# Patient Record
Sex: MALE | Race: WHITE | Employment: OTHER | URBAN - METROPOLITAN AREA
[De-identification: names, ages, dates, MRNs, and addresses within clinical notes are randomized per-mention and may not be internally consistent; named-entity substitution may affect disease eponyms.]

---

## 2017-12-15 ENCOUNTER — HOSPITAL ENCOUNTER (EMERGENCY)
Facility: CLINIC | Age: 57
Discharge: HOME | End: 2017-12-15
Attending: FAMILY MEDICINE | Admitting: FAMILY MEDICINE
Payer: OTHER MISCELLANEOUS

## 2017-12-15 ENCOUNTER — APPOINTMENT (OUTPATIENT)
Dept: GENERAL RADIOLOGY | Facility: CLINIC | Age: 57
End: 2017-12-15
Attending: FAMILY MEDICINE
Payer: OTHER MISCELLANEOUS

## 2017-12-15 VITALS
HEART RATE: 70 BPM | OXYGEN SATURATION: 97 % | HEIGHT: 76 IN | RESPIRATION RATE: 16 BRPM | WEIGHT: 250 LBS | BODY MASS INDEX: 30.44 KG/M2 | DIASTOLIC BLOOD PRESSURE: 82 MMHG | TEMPERATURE: 97.6 F | SYSTOLIC BLOOD PRESSURE: 129 MMHG

## 2017-12-15 DIAGNOSIS — L03.114 CELLULITIS OF LEFT ELBOW: ICD-10-CM

## 2017-12-15 LAB
ANION GAP SERPL CALCULATED.3IONS-SCNC: 10 MMOL/L (ref 3–14)
BASOPHILS # BLD AUTO: 0 10E9/L (ref 0–0.2)
BASOPHILS NFR BLD AUTO: 0.3 %
BUN SERPL-MCNC: 16 MG/DL (ref 7–30)
CALCIUM SERPL-MCNC: 8.3 MG/DL (ref 8.5–10.1)
CHLORIDE SERPL-SCNC: 101 MMOL/L (ref 94–109)
CO2 SERPL-SCNC: 22 MMOL/L (ref 20–32)
CREAT SERPL-MCNC: 0.79 MG/DL (ref 0.66–1.25)
CRP SERPL-MCNC: 5.2 MG/L (ref 0–8)
DIFFERENTIAL METHOD BLD: NORMAL
EOSINOPHIL # BLD AUTO: 0.2 10E9/L (ref 0–0.7)
EOSINOPHIL NFR BLD AUTO: 2.2 %
ERYTHROCYTE [DISTWIDTH] IN BLOOD BY AUTOMATED COUNT: 12.8 % (ref 10–15)
ERYTHROCYTE [SEDIMENTATION RATE] IN BLOOD BY WESTERGREN METHOD: 5 MM/H (ref 0–20)
GFR SERPL CREATININE-BSD FRML MDRD: >90 ML/MIN/1.7M2
GLUCOSE SERPL-MCNC: 334 MG/DL (ref 70–99)
HCT VFR BLD AUTO: 42.5 % (ref 40–53)
HGB BLD-MCNC: 14.9 G/DL (ref 13.3–17.7)
IMM GRANULOCYTES # BLD: 0 10E9/L (ref 0–0.4)
IMM GRANULOCYTES NFR BLD: 0.3 %
LYMPHOCYTES # BLD AUTO: 2.1 10E9/L (ref 0.8–5.3)
LYMPHOCYTES NFR BLD AUTO: 26.5 %
MCH RBC QN AUTO: 29.9 PG (ref 26.5–33)
MCHC RBC AUTO-ENTMCNC: 35.1 G/DL (ref 31.5–36.5)
MCV RBC AUTO: 85 FL (ref 78–100)
MONOCYTES # BLD AUTO: 1.1 10E9/L (ref 0–1.3)
MONOCYTES NFR BLD AUTO: 14.4 %
NEUTROPHILS # BLD AUTO: 4.4 10E9/L (ref 1.6–8.3)
NEUTROPHILS NFR BLD AUTO: 56.3 %
PLATELET # BLD AUTO: 207 10E9/L (ref 150–450)
POTASSIUM SERPL-SCNC: 4.3 MMOL/L (ref 3.4–5.3)
RBC # BLD AUTO: 4.99 10E12/L (ref 4.4–5.9)
SODIUM SERPL-SCNC: 133 MMOL/L (ref 133–144)
WBC # BLD AUTO: 7.9 10E9/L (ref 4–11)

## 2017-12-15 PROCEDURE — 96374 THER/PROPH/DIAG INJ IV PUSH: CPT | Performed by: FAMILY MEDICINE

## 2017-12-15 PROCEDURE — 73070 X-RAY EXAM OF ELBOW: CPT | Mod: LT

## 2017-12-15 PROCEDURE — 86140 C-REACTIVE PROTEIN: CPT | Performed by: FAMILY MEDICINE

## 2017-12-15 PROCEDURE — 85025 COMPLETE CBC W/AUTO DIFF WBC: CPT | Performed by: EMERGENCY MEDICINE

## 2017-12-15 PROCEDURE — 99284 EMERGENCY DEPT VISIT MOD MDM: CPT | Mod: 25 | Performed by: FAMILY MEDICINE

## 2017-12-15 PROCEDURE — 99284 EMERGENCY DEPT VISIT MOD MDM: CPT | Mod: Z6 | Performed by: FAMILY MEDICINE

## 2017-12-15 PROCEDURE — 85652 RBC SED RATE AUTOMATED: CPT | Performed by: FAMILY MEDICINE

## 2017-12-15 PROCEDURE — 25000128 H RX IP 250 OP 636: Performed by: FAMILY MEDICINE

## 2017-12-15 PROCEDURE — 80048 BASIC METABOLIC PNL TOTAL CA: CPT | Performed by: EMERGENCY MEDICINE

## 2017-12-15 RX ORDER — CEPHALEXIN 500 MG/1
500 CAPSULE ORAL 4 TIMES DAILY
Qty: 40 CAPSULE | Refills: 0 | Status: SHIPPED | OUTPATIENT
Start: 2017-12-15 | End: 2021-11-15

## 2017-12-15 RX ORDER — SULFAMETHOXAZOLE/TRIMETHOPRIM 800-160 MG
1 TABLET ORAL 2 TIMES DAILY
Qty: 20 TABLET | Refills: 0 | Status: SHIPPED | OUTPATIENT
Start: 2017-12-15 | End: 2017-12-25

## 2017-12-15 RX ORDER — CEFAZOLIN SODIUM 2 G/100ML
2 INJECTION, SOLUTION INTRAVENOUS ONCE
Status: COMPLETED | OUTPATIENT
Start: 2017-12-15 | End: 2017-12-15

## 2017-12-15 RX ADMIN — CEFAZOLIN SODIUM 2 G: 2 INJECTION, SOLUTION INTRAVENOUS at 11:08

## 2017-12-15 NOTE — DISCHARGE INSTRUCTIONS
Rest your left elbow as much as possible for the next 48 hours.  Take cephalexin 500 mg 4 times per day  Take trimethoprim sulfamethoxazole twice daily  Return for reevaluation on Sunday to see Dr. Liu  Return sooner if significant new or worsening symptoms including fevers, increasing redness and swelling, or other new concerning symptoms.

## 2017-12-15 NOTE — ED AVS SNAPSHOT
[facility] Emergency Department    [address] [address]    Phone:  [phone]    Fax:  [phone]                                       Kartik Swain   MRN: [id number]    Department:  [facility] Emergency Department   Date of Visit:  12/15/2017           After Visit Summary Signature Page     I have received my discharge instructions, and my questions have been answered. I have discussed any challenges I see with this plan with the nurse or doctor.    ..........................................................................................................................................  Patient/Patient Representative Signature      ..........................................................................................................................................  Patient Representative Print Name and Relationship to Patient    ..................................................               ................................................  Date                                            Time    ..........................................................................................................................................  Reviewed by Signature/Title    ...................................................              ..............................................  Date                                                            Time

## 2017-12-15 NOTE — ED PROVIDER NOTES
History     Chief Complaint   Patient presents with     Joint Swelling     swollen left elbow, hx of mrsa     HPI    Kartik Swain is a 57 year old male who presents with left elbow redness and swelling.  This began 2-3 days ago and has become progressively more painful.  He said he was icing it all night long.  It hurts when he moves it.  He previously had surgery around his elbow.  In 2005 at 2006 he had back surgery and developed an ulnar neuropathy, requiring transposition, which failed and had to be repeated.  He thinks he may have developed an MRSA infection in that elbow at that time.  I reviewed his records through care everywhere but they only go back to 2010.  At that time MRSA was only found in his scalp and his nares but there was no MRSA in joints.  He had been holding his grandson all day on Monday and has after that symptoms began, which was 4 days ago.  His morning his left hand felt numb and tingly but that is now improving.  He states he is not having fevers, sweats, chills, malaise, muscle aches.  He has no other areas of pain or redness or swelling.  He does have a past history of type II diabetes.    Records from care everywhere:  Allergies    No Known Allergies  Current Medications  Reconcile with Patient's Chart    Prescription Sig. Disp. Refills Start Date End Date Status   aspirin 81 MG EC tablet   Take 81 mg by mouth daily.         Active   lisinopril (PRINIVIL,ZESTRIL) 5 MG tablet    Indications: Hypertension Take 1 tablet (5 mg total) by mouth daily. 90 tablet   3 02/16/2015   Active   blood-glucose meter Misc   Use 1 Device As Directed 3 (three) times a day. Pharmacist: Please read note. 1 each   0 02/01/2016 08/25/2020 Active   blood glucose test (ACCU-CHEK SMARTVIEW TEST STRIP) strips    Indications: Type II or unspecified type diabetes mellitus with other specified manifestations, not stated as uncontrolled Use 1 each As Directed as needed. Dispense brand per patient's insurance at  "pharmacy discretion. 100 each   11 02/01/2016   Active   generic lancets (ACCU-CHEK FASTCLIX)    Indications: Type II or unspecified type diabetes mellitus with other specified manifestations, not stated as uncontrolled Dispense brand per patient's insurance at pharmacy discretion. 100 each   11 02/01/2016   Active   atorvastatin (LIPITOR) 40 MG tablet    Indications: Hyperlipidemia TAKE 1 TABLET (40 MG TOTAL) BY MOUTH BEDTIME. 90 tablet   2 04/17/2016   Active   pen needle, diabetic 31 gauge x 5/16\" Ndle    Indications: Type 2 diabetes mellitus Use 4 times daily with insulin 400 each   1 08/18/2016   Active   metFORMIN (GLUCOPHAGE) 1000 MG tablet    Indications: Type 2 diabetes mellitus TAKE 1 TABLET BY MOUTH TWICE A DAY WITH MEALS 180 tablet   0 08/18/2016   Active   pregabalin (LYRICA) 100 MG capsule    Indications: Back pain TAKE 1 CAPSULE (100 MG TOTAL) BY MOUTH 3 (THREE) TIMES A DAY. 270 capsule   1 08/19/2016   Active   oxyCODONE (ROXICODONE) 5 MG immediate release tablet    Indications: Back pain 1-2 tabs PO q4hr PRN pain. Do not drive. Do not mix wih alcohol. 10 tablet   0 08/19/2016   Active   sildenafil (REVATIO) 20 mg tablet    Indications: Erectile dysfunction, unspecified erectile dysfunction type Take 3, 4, or 5 up to once daily for ED. Replaces Viagra. 30 tablet   11 08/24/2016   Active   insulin lispro (HUMALOG KWIKPEN) 100 unit/mL pen   Inject 16 Units under the skin 3 (three) times a day before meals. **REPLACES** Novolog. Mandate by ins. coverage. Pharm: Read note. 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 05/12/2015   Chronic Pansinusitis     Overview:     Created by Conversion  [employer] [facility] Annotation: Feb 25 2009 11:07AM - BradleyMayra mike: per CT 2/09       Allergies     Overview:     Created by Conversion       Adult Sleep Apnea     Overview:     Created by Conversion  [facility] Annotation: Nov 10 2009 11:51AM - Kayli Lowe: not using   CPAP, can't tolerate it    Replacement Utility updated for latest IMO load     Lower Back Pain     Overview:     Created by Conversion  [facility] Annotation: Jan 16 2008 12:24PM - Kylie Garcia: Sporadic flares       Diabetic Peripheral Neuropathy     Overview:     Created by Conversion       Insomnia     Overview:     Created by Conversion       Bee Sting     Overview:     Created by Conversion  [facility] Annotation: Sep 17 2013 9:06AM - Kayli Lowe: with secondary  infection/cellulitis       Diabetes Mellitus With Foot Ulcer     Overview:     Created by Conversion       Male Erectile Disorder     Overview:     Created by Conversion       Anxiety     Overview:     Created by Conversion    Replacement Utility updated for latest IMO load     Hyperlipidemia     Overview:     Created by Conversion       Type 2 Diabetes Mellitus With Complication     Overview:     Created by Conversion       Problem List:    There are no active problems to display for this patient.       Past Medical History:    Past Medical History:   Diagnosis Date     DEPRESSIVE DISORDER NEC      DIABETES MELLITUS TYPE II-UNCOMPL      IDIO PERIPH NEURPTHY NEC        Past Surgical History:    Past Surgical History:   Procedure Laterality Date     SURGICAL HISTORY OF -   1980, 1982    Eardrum Replacement        Family History:    No family history on file.    Social History:  Marital Status:  Single [1]  Social History   Substance Use Topics     Smoking status: Not on file      "Smokeless tobacco: Not on file     Alcohol use Not on file        Medications:      cephALEXin (KEFLEX) 500 MG capsule   sulfamethoxazole-trimethoprim (BACTRIM DS/SEPTRA DS) 800-160 MG per tablet   LANTUS 100 UNIT/ML SC SOLN   LANTUS 100 UNIT/ML SC SOLN   GLUCOPHAGE 1000 MG OR TABS   AVANDIA 8 MG OR TABS   ONE TOUCH ULTRA TEST VI STRP   HUMALOG 100 UNIT/ML SC SOLN     Review of Systems  All other systems are reviewed and are negative    Physical Exam   BP: (!) 157/93  Pulse: 91  Temp: 97.6  F (36.4  C)  Resp: 16  Height: 193 cm (6' 4\")  Weight: 113.4 kg (250 lb)  SpO2: 97 %      Physical Exam  Nursing note and vitals were reviewed.  Constitutional: Awake and alert, healthy appearing 57-year-old in no acute distress, who does not appear acutely ill, and who answers questions appropriately and cooperates with examination.  HEENT: EOMI.   Neck: Freely mobile.  Musculoskeletal: Examination of the left elbow shows erythema surrounding the radial dorsal surface without palpable effusion in the radial humeral joint.  Range of motion is restricted by pain to lacking 30  of full extension.  There is pain with extension flexion and supination.  Flexion and supination are not limited.  There is no lymphangitic streaking.  No axillary adenopathy.  Hand is warm and well perfused.  Photos of the elbow are noted below.  Neurological: Alert, oriented, thought content logical, coherent   Skin: Warm, dry, no rashes.  Psychiatric: Affect broad and appropriate.              ED Course     ED Course     Procedures          Critical Care time:  none            Results for orders placed or performed during the hospital encounter of 12/15/17   Elbow XR, 2 view, left    Narrative    ELBOW LEFT TWO VIEWS December 15, 2017 9:39 AM     HISTORY: Swelling, redness, pain in left elbow.    COMPARISON: None.      Impression    IMPRESSION: Bones are normally aligned. Elbow effusion is present.  Scattered calcifications noted overlying the elbow joint " space, the  larger of which measures 5 mm. Densities also overlie the joint space  of the posterior aspect of the radial head. This calcification  measures up to 1.1 cm. Findings suggesting intra-articular loose  bodies. Slight cortical irregularity is noted at the posterior aspect  of the radial head suggestive of an impaction nondisplaced fracture at  this location. Clinically correlate with trauma for possibility of  acute fracture. Mild enthesopathy at the proximalmost aspect of the  ulna, triceps insertion site.    TAMMY STEVENS MD   CBC with platelets differential   Result Value Ref Range    WBC 7.9 4.0 - 11.0 10e9/L    RBC Count 4.99 4.4 - 5.9 10e12/L    Hemoglobin 14.9 13.3 - 17.7 g/dL    Hematocrit 42.5 40.0 - 53.0 %    MCV 85 78 - 100 fl    MCH 29.9 26.5 - 33.0 pg    MCHC 35.1 31.5 - 36.5 g/dL    RDW 12.8 10.0 - 15.0 %    Platelet Count 207 150 - 450 10e9/L    Diff Method Automated Method     % Neutrophils 56.3 %    % Lymphocytes 26.5 %    % Monocytes 14.4 %    % Eosinophils 2.2 %    % Basophils 0.3 %    % Immature Granulocytes 0.3 %    Absolute Neutrophil 4.4 1.6 - 8.3 10e9/L    Absolute Lymphocytes 2.1 0.8 - 5.3 10e9/L    Absolute Monocytes 1.1 0.0 - 1.3 10e9/L    Absolute Eosinophils 0.2 0.0 - 0.7 10e9/L    Absolute Basophils 0.0 0.0 - 0.2 10e9/L    Abs Immature Granulocytes 0.0 0 - 0.4 10e9/L   Basic metabolic panel   Result Value Ref Range    Sodium 133 133 - 144 mmol/L    Potassium 4.3 3.4 - 5.3 mmol/L    Chloride 101 94 - 109 mmol/L    Carbon Dioxide 22 20 - 32 mmol/L    Anion Gap 10 3 - 14 mmol/L    Glucose 334 (H) 70 - 99 mg/dL    Urea Nitrogen 16 7 - 30 mg/dL    Creatinine 0.79 0.66 - 1.25 mg/dL    GFR Estimate >90 >60 mL/min/1.7m2    GFR Estimate If Black >90 >60 mL/min/1.7m2    Calcium 8.3 (L) 8.5 - 10.1 mg/dL   Erythrocyte sedimentation rate auto   Result Value Ref Range    Sed Rate 5 0 - 20 mm/h   CRP inflammation   Result Value Ref Range    CRP Inflammation 5.2 0.0 - 8.0 mg/L        Assessments & Plan (with Medical Decision Making)     57-year-old male presents with redness and swelling of the left elbow suggestive of cellulitis.  I don't appreciate effusion in the radial humeral joint and have low suspicion for septic arthritis or gout.  I suspect cellulitis of the skin in this area.  I reviewed x-rays with shows some chronic changes but no acute process.  CBC, sed rate, CRP are reassuring.  We are going to treat him with cephalexin and Septra and I will see him back in 2 days, unless he is worsening before that time in which case he should return to the emergency department.  He was advised to rest the elbow as much as possible and to avoid icing it.  He may apply heat not for more than 20 minutes and not to excess.  If he is worsening prior to 2 days from now he should return immediately.  He is comfortable with this plan and his questions were all answered.    I have reviewed the nursing notes.    I have reviewed the findings, diagnosis, plan and need for follow up with the patient.       New Prescriptions    CEPHALEXIN (KEFLEX) 500 MG CAPSULE    Take 1 capsule (500 mg) by mouth 4 times daily    SULFAMETHOXAZOLE-TRIMETHOPRIM (BACTRIM DS/SEPTRA DS) 800-160 MG PER TABLET    Take 1 tablet by mouth 2 times daily for 10 days       Final diagnoses:   Cellulitis of left elbow       12/15/2017   [facility] EMERGENCY DEPARTMENT     Alex Liu MD  12/15/17 0507

## 2017-12-15 NOTE — ED AVS SNAPSHOT
[facility] Emergency Department    [address] [address]    Phone:  [phone]    Fax:  [phone]                                       Kartik Swain   MRN: [id number]    Department:  [facility] Emergency Department   Date of Visit:  12/15/2017           Patient Information     Date Of Birth          1960        Your diagnoses for this visit were:     Cellulitis of left elbow        You were seen by Alex Liu MD.        Discharge Instructions       Rest your left elbow as much as possible for the next 48 hours.  Take cephalexin 500 mg 4 times per day  Take trimethoprim sulfamethoxazole twice daily  Return for reevaluation on Sunday to see Dr. Liu  Return sooner if significant new or worsening symptoms including fevers, increasing redness and swelling, or other new concerning symptoms.    24 Hour Appointment Hotline       To make an appointment at any [facility] clinic, call [phone] ([phone]). If you don't have a family doctor or clinic, we will help you find one. [facility] clinics are conveniently located to serve the needs of you and your family.             Review of your medicines      START taking        Dose / Directions Last dose taken    cephALEXin 500 MG capsule   Commonly known as:  KEFLEX   Dose:  500 mg   Quantity:  40 capsule        Take 1 capsule (500 mg) by mouth 4 times daily   Refills:  0        sulfamethoxazole-trimethoprim 800-160 MG per tablet   Commonly known as:  BACTRIM DS/SEPTRA DS   Dose:  1 tablet   Quantity:  20 tablet        Take 1 tablet by mouth 2 times daily for 10 days   Refills:  0          Our records show that you are taking the medicines listed below. If these are incorrect, please call your family doctor or clinic.        Dose / Directions Last dose taken    AVANDIA 8 MG tablet   Quantity:  60   Generic drug:  rosiglitazone        take one tablet by mouth twice daily   Refills:  2        GLUCOPHAGE 1000 MG tablet    Quantity:  60   Generic drug:  metFORMIN        1 TABLET TWICE DAILY   Refills:  2        humaLOG 100 UNIT/ML injection   Quantity:  10   Generic drug:  insulin lispro        use as directed with meals   Refills:  1        * LANTUS VIAL 100 UNIT/ML injection   Quantity:  10   Generic drug:  insulin glargine        as directed   Refills:  1        * LANTUS VIAL 100 UNIT/ML injection   Quantity:  10   Generic drug:  insulin glargine        inect instructed amount of lantus subcutaneously daily as directed   Refills:  1        ONETOUCH ULTRA test strip   Quantity:  100   Generic drug:  blood glucose monitoring        QID AND PRN   Refills:  PRN 1YR        * Notice:  This list has 2 medication(s) that are the same as other medications prescribed for you. Read the directions carefully, and ask your doctor or other care provider to review them with you.            Prescriptions were sent or printed at these locations (2 Prescriptions)                   [facility] - [place], [place] - [address], [place] [address]    Telephone:  [phone]   Fax:  [phone]   Hours:                  E-Prescribed (2 of 2)         cephALEXin (KEFLEX) 500 MG capsule               sulfamethoxazole-trimethoprim (BACTRIM DS/SEPTRA DS) 800-160 MG per tablet                Procedures and tests performed during your visit     Basic metabolic panel    CBC with platelets differential    CRP inflammation    Elbow XR, 2 view, left    Erythrocyte sedimentation rate auto      Orders Needing Specimen Collection     None      Pending Results     No orders found from 12/13/2017 to 12/16/2017.            Pending Culture Results     No orders found from 12/13/2017 to 12/16/2017.            Pending Results Instructions     If you had any lab results that were not finalized at the time of your Discharge, you can call the ED Lab Result RN at [phone]. You will be contacted by this team for any  positive Lab results or changes in treatment. The nurses are available 7 days a week from 10A to 6:30P.  You can leave a message 24 hours per day and they will return your call.        Test Results From Your Hospital Stay        12/15/2017  9:30 AM      Component Results     Component Value Ref Range & Units Status    WBC 7.9 4.0 - 11.0 10e9/L Final    RBC Count 4.99 4.4 - 5.9 10e12/L Final    Hemoglobin 14.9 13.3 - 17.7 g/dL Final    Hematocrit 42.5 40.0 - 53.0 % Final    MCV 85 78 - 100 fl Final    MCH 29.9 26.5 - 33.0 pg Final    MCHC 35.1 31.5 - 36.5 g/dL Final    RDW 12.8 10.0 - 15.0 % Final    Platelet Count 207 150 - 450 10e9/L Final    Diff Method Automated Method  Final    % Neutrophils 56.3 % Final    % Lymphocytes 26.5 % Final    % Monocytes 14.4 % Final    % Eosinophils 2.2 % Final    % Basophils 0.3 % Final    % Immature Granulocytes 0.3 % Final    Absolute Neutrophil 4.4 1.6 - 8.3 10e9/L Final    Absolute Lymphocytes 2.1 0.8 - 5.3 10e9/L Final    Absolute Monocytes 1.1 0.0 - 1.3 10e9/L Final    Absolute Eosinophils 0.2 0.0 - 0.7 10e9/L Final    Absolute Basophils 0.0 0.0 - 0.2 10e9/L Final    Abs Immature Granulocytes 0.0 0 - 0.4 10e9/L Final         12/15/2017  9:43 AM      Component Results     Component Value Ref Range & Units Status    Sodium 133 133 - 144 mmol/L Final    Potassium 4.3 3.4 - 5.3 mmol/L Final    Chloride 101 94 - 109 mmol/L Final    Carbon Dioxide 22 20 - 32 mmol/L Final    Anion Gap 10 3 - 14 mmol/L Final    Glucose 334 (H) 70 - 99 mg/dL Final    Urea Nitrogen 16 7 - 30 mg/dL Final    Creatinine 0.79 0.66 - 1.25 mg/dL Final    GFR Estimate >90 >60 mL/min/1.7m2 Final    Non  GFR Calc    GFR Estimate If Black >90 >60 mL/min/1.7m2 Final    African American GFR Calc    Calcium 8.3 (L) 8.5 - 10.1 mg/dL Final         12/15/2017 11:03 AM      Narrative     ELBOW LEFT TWO VIEWS December 15, 2017 9:39 AM     HISTORY: Swelling, redness, pain in left elbow.    COMPARISON:  "None.        Impression     IMPRESSION: Bones are normally aligned. Elbow effusion is present.  Scattered calcifications noted overlying the elbow joint space, the  larger of which measures 5 mm. Densities also overlie the joint space  of the posterior aspect of the radial head. This calcification  measures up to 1.1 cm. Findings suggesting intra-articular loose  bodies. Slight cortical irregularity is noted at the posterior aspect  of the radial head suggestive of an impaction nondisplaced fracture at  this location. Clinically correlate with trauma for possibility of  acute fracture. Mild enthesopathy at the proximalmost aspect of the  ulna, triceps insertion site.    TAMMY STEVENS MD         12/15/2017 10:14 AM      Component Results     Component Value Ref Range & Units Status    Sed Rate 5 0 - 20 mm/h Final         12/15/2017 10:21 AM      Component Results     Component Value Ref Range & Units Status    CRP Inflammation 5.2 0.0 - 8.0 mg/L Final                Thank you for choosing [facility]       Thank you for choosing [facility] for your care. Our goal is always to provide you with excellent care. Hearing back from our patients is one way we can continue to improve our services. Please take a few minutes to complete the written survey that you may receive in the mail after you visit with us. Thank you!        [employer] Information     [employer] lets you send messages to your doctor, view your test results, renew your prescriptions, schedule appointments and more. To sign up, go to [email or web address] Click on \"Log in\" on the left side of the screen, which will take you to the Welcome page. Then click on \"Sign up Now\" on the right side of the page.     You will be asked to enter the access code listed below, as well as some personal information. Please follow the directions to create your username and password.     Your access code is: [email or web address]  Expires: 3/15/2018 12:41 PM     Your access code will "  in 90 days. If you need help or a new code, please call your [facility] clinic or [phone].        Care EveryWhere ID     This is your Care EveryWhere ID. This could be used by other organizations to access your [facility] medical records  [id number]        Equal Access to Services     MAT TRONCOSO : Kishan Zuniga, waaxforrest luqadaha, qaybta kaalmaforrest valdez, kash barker. So [facility] [phone].    ATENCIÓN: Si habla español, tiene a santos disposición servicios gratuitos de asistencia lingüística. Llame al [phone].    We comply with applicable federal civil rights laws and [place] laws. We do not discriminate on the basis of race, color, national origin, age, disability, sex, sexual orientation, or gender identity.            After Visit Summary       This is your record. Keep this with you and show to your community pharmacist(s) and doctor(s) at your next visit.

## 2017-12-17 ENCOUNTER — HOSPITAL ENCOUNTER (EMERGENCY)
Facility: CLINIC | Age: 57
Discharge: HOME | End: 2017-12-17
Attending: FAMILY MEDICINE | Admitting: FAMILY MEDICINE
Payer: OTHER MISCELLANEOUS

## 2017-12-17 VITALS
WEIGHT: 250 LBS | HEART RATE: 95 BPM | TEMPERATURE: 97.8 F | SYSTOLIC BLOOD PRESSURE: 170 MMHG | RESPIRATION RATE: 14 BRPM | OXYGEN SATURATION: 97 % | BODY MASS INDEX: 30.43 KG/M2 | DIASTOLIC BLOOD PRESSURE: 95 MMHG

## 2017-12-17 DIAGNOSIS — L03.114 LEFT ARM CELLULITIS: ICD-10-CM

## 2017-12-17 PROCEDURE — 99282 EMERGENCY DEPT VISIT SF MDM: CPT | Performed by: FAMILY MEDICINE

## 2017-12-17 PROCEDURE — 99284 EMERGENCY DEPT VISIT MOD MDM: CPT | Mod: Z6 | Performed by: FAMILY MEDICINE

## 2017-12-17 RX ORDER — ATORVASTATIN CALCIUM 40 MG/1
40 TABLET, FILM COATED ORAL AT BEDTIME
COMMUNITY
Start: 2021-11-15

## 2017-12-17 RX ORDER — PREGABALIN 100 MG/1
100 CAPSULE ORAL 3 TIMES DAILY
COMMUNITY
Start: 2021-11-15

## 2017-12-17 NOTE — ED AVS SNAPSHOT
[facility] Emergency Department    [address] [address]    Phone:  [phone]    Fax:  [phone]                                       Kartik Swain   MRN: [id number]    Department:  [facility] Emergency Department   Date of Visit:  12/17/2017           After Visit Summary Signature Page     I have received my discharge instructions, and my questions have been answered. I have discussed any challenges I see with this plan with the nurse or doctor.    ..........................................................................................................................................  Patient/Patient Representative Signature      ..........................................................................................................................................  Patient Representative Print Name and Relationship to Patient    ..................................................               ................................................  Date                                            Time    ..........................................................................................................................................  Reviewed by Signature/Title    ...................................................              ..............................................  Date                                                            Time

## 2017-12-17 NOTE — DISCHARGE INSTRUCTIONS
Follow-up in 2 days for recheck.  Follow up with [facility] orthopedics if possible otherwise and primary care or back in urgent care.  Continue current antibiotic therapy.  Continue minimizing use of the arm.  Return sooner if new or worsening symptoms including increased pain, redness, or systemic symptoms of fever, nausea, malaise.

## 2017-12-17 NOTE — ED AVS SNAPSHOT
[facility]    [address] [facility] [address]    Phone:  [phone]    Fax:  [phone]                                       Kartik Swain   MRN: [id number]    Department:  [facility]   Date of Visit:  12/17/2017           Patient Information     Date Of Birth          1960        Your diagnoses for this visit were:     Left arm cellulitis        You were seen by Alex Liu MD.      Follow-up Information     Follow up with Orthopaedics, [facility].    Contact information:    [address] [place] [address]  [phone]          Discharge Instructions       Follow-up in 2 days for recheck.  Follow up with [facility] orthopedics if possible otherwise and primary care or back in urgent care.  Continue current antibiotic therapy.  Continue minimizing use of the arm.  Return sooner if new or worsening symptoms including increased pain, redness, or systemic symptoms of fever, nausea, malaise.    24 Hour Appointment Hotline       To make an appointment at any [facility] clinic, call [phone] ([phone]). If you don't have a family doctor or clinic, we will help you find one. [facility] clinics are conveniently located to serve the needs of you and your family.             Review of your medicines      Our records show that you are taking the medicines listed below. If these are incorrect, please call your family doctor or clinic.        Dose / Directions Last dose taken    atorvastatin 40 MG tablet   Commonly known as:  LIPITOR        TAKE 1 TABLET (40 MG TOTAL) BY MOUTH BEDTIME.   Refills:  0        AVANDIA 8 MG tablet   Quantity:  60   Generic drug:  rosiglitazone        take one tablet by mouth twice daily   Refills:  2        cephALEXin 500 MG capsule   Commonly known as:  KEFLEX   Dose:  500 mg   Quantity:  40 capsule        Take 1 capsule (500 mg) by mouth 4 times daily   Refills:  0        GLUCOPHAGE 1000 MG tablet    Quantity:  60   Generic drug:  metFORMIN        1 TABLET TWICE DAILY   Refills:  2        humaLOG 100 UNIT/ML injection   Quantity:  10   Generic drug:  insulin lispro        use as directed with meals   Refills:  1        LANTUS VIAL 100 UNIT/ML injection   Quantity:  10   Generic drug:  insulin glargine        as directed   Refills:  1        LOSARTAN POTASSIUM PO   Dose:  25 mg        Take 25 mg by mouth daily   Refills:  0        ONETOUCH ULTRA test strip   Quantity:  100   Generic drug:  blood glucose monitoring        QID AND PRN   Refills:  PRN 1YR        pregabalin 100 MG capsule   Commonly known as:  LYRICA        TAKE 1 CAPSULE (100 MG TOTAL) BY MOUTH 3 (THREE) TIMES A DAY.   Refills:  0        sulfamethoxazole-trimethoprim 800-160 MG per tablet   Commonly known as:  BACTRIM DS/SEPTRA DS   Dose:  1 tablet   Quantity:  20 tablet        Take 1 tablet by mouth 2 times daily for 10 days   Refills:  0                Orders Needing Specimen Collection     None      Pending Results     No orders found from 12/15/2017 to 12/18/2017.            Pending Culture Results     No orders found from 12/15/2017 to 12/18/2017.            Pending Results Instructions     If you had any lab results that were not finalized at the time of your Discharge, you can call the ED Lab Result RN at [phone]. You will be contacted by this team for any positive Lab results or changes in treatment. The nurses are available 7 days a week from 10A to 6:30P.  You can leave a message 24 hours per day and they will return your call.        Test Results From Your Hospital Stay               Thank you for choosing [facility]       Thank you for choosing [facility] for your care. Our goal is always to provide you with excellent care. Hearing back from our patients is one way we can continue to improve our services. Please take a few minutes to complete the written survey that you may receive in the mail after you visit with us. Thank you!       "  MyChart Information     [employer] lets you send messages to your doctor, view your test results, renew your prescriptions, schedule appointments and more. To sign up, go to [email or web address] . Click on \"Log in\" on the left side of the screen, which will take you to the Welcome page. Then click on \"Sign up Now\" on the right side of the page.     You will be asked to enter the access code listed below, as well as some personal information. Please follow the directions to create your username and password.     Your access code is: [email or web address]  Expires: 3/15/2018 12:41 PM     Your access code will  in 90 days. If you need help or a new code, please call your [facility] clinic or [phone].        Care EveryWhere ID     This is your Care EveryWhere ID. This could be used by other organizations to access your [facility] medical records  [id number]        Equal Access to Services     MAT TRONCOSO : Hadvincent johno Sovandana, waaxda luleti, qaybta kaalmaforrest valdez, kash dominique . [facility] [phone].    ATENCIÓN: Si habla español, tiene a santos disposición servicios gratuitos de asistencia lingüística. Llame al [phone].    We comply with applicable federal civil rights laws and [place] laws. We do not discriminate on the basis of race, color, national origin, age, disability, sex, sexual orientation, or gender identity.            After Visit Summary       This is your record. Keep this with you and show to your community pharmacist(s) and doctor(s) at your next visit.                  "

## 2017-12-17 NOTE — ED PROVIDER NOTES
History     Chief Complaint   Patient presents with     Wound Check     here for recheck of elbow     HPI     Kartik Swain is a 57 year old male who presents to the ED for recheck of left arm cellulitis. The patient was seen in the ED two days ago for left elbow redness and swelling suggestive of cellulitis. He was discharged with cephalexin and septra and was told to be re-evaluated in 2 days. The patient reports that since discharge are largely unchanged.  He says they are not worse.  Redness is decreased but swelling is increased.  Limitation of elbow extension has not appreciably changed.  He continues to have this now systemic symptoms of illness including no fever or malaise nausea.  Overall he feels about the same after a day and a half of antibiotic therapy.     Problem List:    There are no active problems to display for this patient.       Past Medical History:    Past Medical History:   Diagnosis Date     DEPRESSIVE DISORDER NEC      DIABETES MELLITUS TYPE II-UNCOMPL      IDIO PERIPH NEURPTHY NEC        Past Surgical History:    Past Surgical History:   Procedure Laterality Date     SURGICAL HISTORY OF -   1980, 1982    Eardrum Replacement        Family History:    No family history on file.    Social History:  Marital Status:  Single [1]  Social History   Substance Use Topics     Smoking status: Not on file     Smokeless tobacco: Not on file     Alcohol use Not on file        Medications:      atorvastatin (LIPITOR) 40 MG tablet   pregabalin (LYRICA) 100 MG capsule   LOSARTAN POTASSIUM PO   cephALEXin (KEFLEX) 500 MG capsule   sulfamethoxazole-trimethoprim (BACTRIM DS/SEPTRA DS) 800-160 MG per tablet   LANTUS 100 UNIT/ML SC SOLN   GLUCOPHAGE 1000 MG OR TABS   AVANDIA 8 MG OR TABS   ONE TOUCH ULTRA TEST VI STRP   HUMALOG 100 UNIT/ML SC SOLN         Review of Systems  Further problem focused review negative.    Physical Exam   BP: (!) 170/95  Pulse: 95  Temp: 97.8  F (36.6  C)  Resp: 14  Weight: 113.4  kg (250 lb)  SpO2: 97 %      Physical Exam  On examination the patient appears clinically well.  Examination of the left elbow reveals resolution of the erythema with somewhat increased edema surrounding the elbow out effusion in the radial humeral joint.  Elbow extension still lacks 30 .  No distal loss of function.    ED Course     ED Course     Procedures               Critical Care time:  none               Labs Ordered and Resulted from Time of ED Arrival Up to the Time of Departure from the ED - No data to display  No results found for this or any previous visit (from the past 24 hour(s)).    Medications - No data to display    10:37 Patient assessed. Course of care outlined.       Assessments & Plan (with Medical Decision Making)     57-year-old male presents for recheck of erythema and swelling around the left elbow.  This likely represents cellulitis.  On his previous visit as well as today I have low suspicion for joint involvement.  Overall he is somewhat improved in terms of redness but otherwise without significant change in his symptoms.  I don't think this represents treatment failure and I think continue to use oral antibiotic therapy, rest the elbow and be rechecked in 2 days.  He should return sooner if he has increased pain, return of redness, or other new concerning symptoms.    I have reviewed the nursing notes.    I have reviewed the findings, diagnosis, plan and need for follow up with the patient.       Discharge Medication List as of 12/17/2017 10:43 AM          Final diagnoses:   Left arm cellulitis     This document serves as a record of the services and decisions personally performed and made by Alex Liu MD. It was created on HIS/HER behalf by Yecenia Jaime, a trained medical scribe. The creation of this document is based the provider's statements to the medical scribe.  Yecenia Jaime 10:37 AM 12/17/2017    Provider:   The information in this document, created by the medical  scribe for me, accurately reflects the services I personally performed and the decisions made by me. I have reviewed and approved this document for accuracy prior to leaving the patient care area.  Alex Liu MD 10:37 AM 12/17/2017 12/17/2017   [facility] EMERGENCY DEPARTMENT     Alex Liu MD  12/17/17 [id number]

## 2021-05-30 ENCOUNTER — RECORDS - HEALTHEAST (OUTPATIENT)
Dept: ADMINISTRATIVE | Facility: CLINIC | Age: 61
End: 2021-05-30

## 2021-06-01 ENCOUNTER — RECORDS - HEALTHEAST (OUTPATIENT)
Dept: ADMINISTRATIVE | Facility: CLINIC | Age: 61
End: 2021-06-01

## 2021-06-02 ENCOUNTER — RECORDS - HEALTHEAST (OUTPATIENT)
Dept: ADMINISTRATIVE | Facility: CLINIC | Age: 61
End: 2021-06-02

## 2021-11-12 PROBLEM — G89.4 CHRONIC PAIN DISORDER: Status: ACTIVE | Noted: 2020-07-06

## 2021-11-12 PROBLEM — E11.8 TYPE 2 DIABETES MELLITUS WITH COMPLICATION (H): Status: ACTIVE | Noted: 2021-11-12

## 2021-11-12 PROBLEM — E78.49 OTHER HYPERLIPIDEMIA: Status: ACTIVE | Noted: 2021-11-04

## 2021-11-12 PROBLEM — E11.9 CONTROLLED TYPE 2 DIABETES MELLITUS WITHOUT COMPLICATION, WITHOUT LONG-TERM CURRENT USE OF INSULIN (H): Status: ACTIVE | Noted: 2017-11-03

## 2021-11-12 PROBLEM — E66.811 OBESITY (BMI 30.0-34.9): Status: ACTIVE | Noted: 2019-08-09

## 2021-11-12 PROBLEM — D72.829 LEUKOCYTOSIS: Status: ACTIVE | Noted: 2021-11-03

## 2021-11-12 PROBLEM — Z71.89 ADVANCED CARE PLANNING/COUNSELING DISCUSSION: Status: ACTIVE | Noted: 2017-06-05

## 2021-11-12 PROBLEM — Z86.14 HISTORY OF MRSA INFECTION: Status: ACTIVE | Noted: 2021-02-05

## 2021-11-12 PROBLEM — Z86.31 HISTORY OF DIABETIC ULCER OF FOOT: Status: ACTIVE | Noted: 2021-02-05

## 2021-11-12 PROBLEM — E11.10 DIABETIC KETOACIDOSIS WITHOUT COMA ASSOCIATED WITH TYPE 2 DIABETES MELLITUS (H): Status: ACTIVE | Noted: 2021-11-02

## 2021-11-12 PROBLEM — G89.29 CHRONIC BILATERAL LOW BACK PAIN WITH LEFT-SIDED SCIATICA: Status: ACTIVE | Noted: 2017-06-06

## 2021-11-12 PROBLEM — I21.4 NSTEMI (NON-ST ELEVATED MYOCARDIAL INFARCTION) (H): Status: ACTIVE | Noted: 2021-11-04

## 2021-11-12 PROBLEM — A49.02 MRSA (METHICILLIN RESISTANT STAPHYLOCOCCUS AUREUS): Status: ACTIVE | Noted: 2021-11-08

## 2021-11-12 PROBLEM — K35.33: Status: ACTIVE | Noted: 2021-11-04

## 2021-11-12 PROBLEM — E11.65 UNCONTROLLED TYPE 2 DIABETES MELLITUS WITH HYPERGLYCEMIA (H): Status: ACTIVE | Noted: 2018-07-25

## 2021-11-12 PROBLEM — I24.9 ACS (ACUTE CORONARY SYNDROME) (H): Status: ACTIVE | Noted: 2021-11-04

## 2021-11-12 PROBLEM — M54.42 CHRONIC BILATERAL LOW BACK PAIN WITH LEFT-SIDED SCIATICA: Status: ACTIVE | Noted: 2017-06-06

## 2021-11-12 NOTE — PROGRESS NOTES
[facility] GERIATRIC SERVICES  Primary Care Provider & Clinic: GULSHAN FLAHERTY, [facility] [address] [facility] [address]; Phone: [phone]; Fax: [phone]  Chief Complaint   Patient presents with     Hospital F/U     [facility] 11/4/2021 - 11/13/2021     [facility] Medical Record Number: [id number]  Place of Service Where Encounter Took Place: [facility] - [facility] ([facility]) [[id number]]    Kartik Swain is a 61 year old (1960), admitted to the above facility from  [facility] . Hospital stay 11/4/21 through 11/13/21.    HPI:    Kartik Swain is a 62 yo male, PMH DM2, CORAL, HTN, and chronic pain, who was initially admitted for NSTEMI, but found to have MRSA bacteremia and concern for psoas abscess/fluid collection on lumbar MRI. I&D on 11/7, hemovac placed on 11/7. Removed on 11/8.. PICC Line placed on 11/11. Per ID will need 4-6 weeks of IV abx. Echo with EF of 40%, was diuresed with IV lasix. Developed with Afib RVR, cardiology consulted. Angiogram on 11/9. 2 stents placed. Started on DAPT x 1 week + apixaban. Developed MASON, losartan put on hold. Developed urinary retention, felt due to BPH. Lyrica held briefly due to MASON. Worsening back pain, per neurosurgery hardware appropriately seated, briefly on tramadol and oxycodone, Lyrica and flexeril resumed. Endocrine followed for BG management as recent hospitalization for mild DKA. When medically stable was discharged to TCU for further rehab and medical management.     Patient seen today resting in bed. Daughter is present during visit. He was noted to have some dark, red tinged urine this AM. On exam it is clearing, suspect catheter may have been tugged and he had some bleeding due multiple blood thinners. Pain is 2-3/10 currently, had a lot of pain earlier when staff was repositioning him. He report chronic back pain, uses oxycodone very rarely (may 40/year) at home He does try to stay active. Voice has been very soft since his  surgery, denies sore throat, has been sucking on lozenges and drinking fluids. He says he feels very weak, but was able to sit at the bedside and transfer with 1-2 person assist yesterday and feels very happy about this.     CODE STATUS/ADVANCE DIRECTIVES DISCUSSION: No Order - CPR/Full code   Patient's living condition: lives alone  ALLERGIES: No Known Allergies   PAST MEDICAL HISTORY:   Past Medical History:   Diagnosis Date     Depressive disorder, not elsewhere classified      Other specified idiopathic peripheral neuropathy     Peripheral Neuropathy      Type II or unspecified type diabetes mellitus without mention of complication, not stated as uncontrolled     DM      PAST SURGICAL HISTORY:  has a past surgical history that includes surgical history of -  (1980, 1982); Pr Arthrodesis Ant Interbody Min Discectomy,Lumbar; AMPUTATION TOE,MT-P JT; [place] Tooth Extraction (Left); and [place] Tooth Extraction (Right).  FAMILY HISTORY: family history includes Breast Cancer (age of onset: 44.00) in his mother; Cancer (age of onset: 37.00) in his brother; Heart Disease in his father; No Known Problems in his daughter, son, and son.  SOCIAL HISTORY:  reports that he has never smoked. He has never used smokeless tobacco. He reports current alcohol use. He reports that he does not use drugs.    Post Discharge Medication Reconciliation Status: discharge medications reconciled and changed, per note/orders  Current Outpatient Medications   Medication Sig     acetaminophen 325 MG TABS Take 650 mg by mouth 3 times daily     alteplase 2 mg/2 mL (in 10 mL syringe) Inject 2 mg into the vein daily as needed As needed for occluded IV - instill 2 mg and after 20 minutes may repeat 2 mg dose     [START ON 11/18/2021] amiodarone (PACERONE) 200 MG tablet Take 1 tablet (200 mg) by mouth daily     amiodarone (PACERONE) 400 MG tablet Take 1 tablet (400 mg) by mouth 2 times daily for 4 days     apixaban ANTICOAGULANT (ELIQUIS) 5 MG tablet  Take 5 mg by mouth 2 times daily     aspirin (ASA) 81 MG chewable tablet Take 81 mg by mouth every morning     atorvastatin (LIPITOR) 40 MG tablet Take 40 mg by mouth At Bedtime      bisacodyl (DULCOLAX) 10 MG suppository Place 10 mg rectally daily as needed for constipation     clopidogrel (PLAVIX) 75 MG tablet Take 75 mg by mouth every morning     DM-Doxylamine-Acetaminophen (NYQUIL HBP COLD & FLU) 15-6. MG/15ML LIQD Take 30 mLs by mouth At Bedtime     fluticasone (FLONASE) 50 MCG/ACT nasal spray Spray 2 sprays into both nostrils daily as needed for rhinitis or allergies     furosemide (LASIX) 40 MG tablet Take 40 mg by mouth every morning     GLUCOPHAGE 1000 MG OR TABS 1 TABLET TWICE DAILY     insulin glargine (LANTUS PEN) 100 UNIT/ML pen Inject 40 Units Subcutaneous 2 times daily     insulin lispro (HUMALOG KWIKPEN) 100 UNIT/ML (1 unit dial) KWIKPEN Inject 10 Units Subcutaneous 3 times daily (before meals)     melatonin 3 MG tablet Take 6 mg by mouth nightly as needed for sleep     methocarbamol (ROBAXIN) 500 MG tablet Take 500 mg by mouth 3 times daily as needed for muscle spasms     metoprolol succinate ER (TOPROL-XL) 25 MG 24 hr tablet Take 25 mg by mouth daily     MULTIPLE VITAMINS-MINERALS PO Take 1 tablet by mouth every morning     nitroGLYcerin (NITROSTAT) 0.4 MG sublingual tablet Place 0.4 mg under the tongue every 5 minutes as needed for chest pain For chest pain place 1 tablet under the tongue every 5 minutes for 3 doses. If symptoms persist 5 minutes after 1st dose call 911.     ONE TOUCH ULTRA TEST VI STRP QID AND PRN     oxyCODONE (ROXICODONE) 5 MG tablet Take 5-10 mg by mouth every 6 hours as needed for pain 1 tab (5 mg) PO Q6H PRN for pain 1-5/10 AND 2 tabs (10 mg) PO Q6H PRN for pain 6-10/10     polyethylene glycol (MIRALAX) 17 GM/Dose powder Take 1 capful by mouth daily as needed for constipation     pregabalin (LYRICA) 100 MG capsule Take 100 mg by mouth 3 times daily      senna-docusate  "(SENOKOT-S/PERICOLACE) 8.6-50 MG tablet Take 2 tablets by mouth 2 times daily as needed for constipation     sodium chloride 0.9% infusion Inject 10 mLs into the vein See Admin Instructions IV flush 10 mL as needed - pre and post IV abx and after blood draws     tamsulosin (FLOMAX) 0.4 MG capsule Take 0.4 mg by mouth At Bedtime     TUSSIN GUAIFENESIN OR Take 400 mg by mouth every 4 hours as needed (cough) For 3 days - ends 11/17/2021     VANCOMYCIN HCL IV Inject 1,750 mg into the vein every morning     LOSARTAN POTASSIUM PO Take 25 mg by mouth daily (Patient not taking: Reported on 11/15/2021)     No current facility-administered medications for this visit.     ROS:  4 point ROS including Respiratory, CV, GI and , other than that noted in the HPI,  is negative    Vitals:  /69   Pulse 86   Temp 98.3  F (36.8  C)   Resp 16   Ht 1.93 m (6' 4\")   Wt 117.2 kg (258 lb 6.4 oz)   SpO2 95%   BMI 31.45 kg/m    Exam:  GENERAL APPEARANCE:  Alert, in no distress, cooperative  RESP:  lungs clear to auscultation , no respiratory distress  CV:  Palpation and auscultation of heart done , regular rate and rhythm, no murmur, rub, or gallop, peripheral edema 1-2+ in BLE  ABDOMEN:  bowel sounds normal, soft, non-tender, eli draining clear yellow urine with sediment  M/S:   no gross joint deformities  SKIN:  moderate clear drainage from left addominal incisions, sutures intact, right going insertion site scabbed, no bruising  NEURO:   Cn 2-12 grossly intact, speech soft  PSYCH:  oriented X 3, affect and mood normal    Lab/Diagnostic data:  Recent labs in [facility] reviewed by me today.  Labs in Epic chart marked for merge.    ASSESSMENT/PLAN:  Psoas abscess (H)  MRSA infection  Noted on MRI with SI joint involvement, + Blood cultures with MRSA on 11/4 and 11/5-6; negative on 11/7 and 11/9. S/p I&D on 11/7, Hemovac out on 11/8, continues to have some drainage from surgical drain site.  Discussed with patient, nursing staff, " IDT team  - continue current wound care, change PRN for drainage  - IV vanco x 4- 6 weeks - Infusion pharmacy to dose  - continue current PICC line cares.   - Weekly Vanco level, CBC, creatinine, GFR, CRP and LFT's, faxed to ID.   - follow-up with ID on 12/9  - follow-up with ortho on 12/2    Uncontrolled type 2 diabetes mellitus with hyperglycemia (H)  Most recent A1C was 10.9%, recent hospitalization for DKA,  Discussed with patient, nursing staff.   - continue glargine 40 units daily, lispo 10 units TID with meals, metformin 1000mg BID.   - QID BGT, continue to monitor and adjust    Coronary artery disease involving native coronary artery of native heart without angina pectoris  NSTEMI (non-ST elevated myocardial infarction) (H)  Acute on chronic diastolic congestive heart failure (H)  Essential hypertension  Fluid overload while IP, aggressively diureised. EF 40%, Angiogram on 11/9, s/p 2 stents to proximal and mid-LAD. Angio site healing well.  Discussed with patient, nursing staff. Limited data to trend BP and weights.   - Renal function stable today, ok to resume losartan 25mg daily  - continue ASA x 1 week, continue Plavix 75mg daily, lasix 40mg daily, atorvastatin 40mg at bedtime, metoprolol CL 25mg daily,   - daily weights  - follow-up with cardiology in 4 weeks, continue to monitor and adjust       Atrial fibrillation with RVR (H)  Presented on 11/5 with -150. Cardiology consulted. HR in TCU 70-90.  Discussed with patient, nursing staff.   - new on apixaban 5mg BID  - amiodarone 400mg BID through 11/17 then 200mg daily  - metoprolol XL 25mg daily.   - follow-up with cardiology in 4 weeks    Benign prostatic hyperplasia with urinary retention  Eli catheter in place  New eli catheter while IP due to urinary retention, new on flomax. Patient requesting to attempt voiding trial in TCU.  Discussed with patient, nursing staff.   - continue tamsulosin at bedtime  - will attempt voiding trial at future  date in TCU once mobility improves    Chronic bilateral low back pain, unspecified whether sciatica present  Chronic, currently having acute post op pain.  Discussed with patient, nursing staff.   - schedule APAP 650mg TID - will not schedule at HS as Nyquil at HS has APAP in it  - oxycodone 5-10mg q4h PRN, Lyrica 100mg TID,   - biofreeze BID and BID prn to low back/hip    Slow transit constipation  Report some constipation.  Discussed with patient, nursing staff.   - continue miralax daily PRN, senna-s 2 tab BID PRN. Continue to monitor and adjust     CORAL (obstructive sleep apnea)  - does not tolerate CPAP    Acute kidney injury (H)  Hyponatremia  In the setting of diuresis, obstructive uropathy. Creatinine baseline ~1 on labs today  - resume losartan 25mg daily  - weekly creatinine/GFR while on Vanco.     Chronic cough  Patient report chronic, would like to use Nyquil daughter has supplied   - ok to use Nyquil family supplied at bedtime - does have APAP in it so will dose around this    Physical deconditioning  In the setting of acute illness, prolonged hospitalization. Goals is to discharge home  Discussed with patient, nursing staff, therapy, IDT team  - PT/OT eval and treat. Discharge planning per their recommendation  -  to assist with discharge planning.         Orders:  1. Change APAP to 650mg PO tid, do not schedule at hs  2. nyquil (patient supplied) 30mg PO at bedtime (contains APAP)  3. Resume losartan 25mg po q day DX htn, chf  4. Hold urology consult, will attempt voiding trail in TCU - date TBD  5. Daily weight  6. biofreeze BID and BID PRN to low back/hip for pain     Total time spent with patient visit at the skilled nursing facility was 40 minutes including patient visit and review of past records. Greater than 50% of total time spent with counseling and coordinating care due to review of medications, discussion fo plan of care and patient education d/t psoas abscess, acute and  chronic pain, NSTEMI, CKD, CHF, HTN, Afib new on blood thinners, urinary retention with eli, deconditioning,  And DM2.     Electronically signed by: LO Chicas CNP

## 2021-11-13 ENCOUNTER — TELEPHONE (OUTPATIENT)
Dept: GERIATRICS | Facility: CLINIC | Age: 61
End: 2021-11-13
Payer: COMMERCIAL

## 2021-11-13 ENCOUNTER — LAB REQUISITION (OUTPATIENT)
Dept: LAB | Facility: CLINIC | Age: 61
End: 2021-11-13

## 2021-11-13 DIAGNOSIS — A49.02 METHICILLIN RESISTANT STAPHYLOCOCCUS AUREUS INFECTION, UNSPECIFIED SITE: ICD-10-CM

## 2021-11-13 DIAGNOSIS — I48.0 PAROXYSMAL ATRIAL FIBRILLATION (H): Primary | ICD-10-CM

## 2021-11-13 RX ORDER — AMIODARONE HYDROCHLORIDE 400 MG/1
400 TABLET ORAL 2 TIMES DAILY
Qty: 8 TABLET | Refills: 0
Start: 2021-11-13 | End: 2021-11-18

## 2021-11-13 RX ORDER — AMIODARONE HYDROCHLORIDE 200 MG/1
200 TABLET ORAL DAILY
Start: 2021-11-18

## 2021-11-13 NOTE — TELEPHONE ENCOUNTER
Nursing said they received an alert for BB and amiodarone for potential bradycardia.    Read the discharge summary and there I`s overlapping of platelet therapy as well as amiodarone and BB together.    Asked staff not to put the toprol at the same time and then put a parameter of holding if pulse <60.      Electronically signed by Nicole Guardado RN, CNP

## 2021-11-13 NOTE — TELEPHONE ENCOUNTER
Staff calling to clarify orders of amiodarone.  One order for amiodarone 400mg twice a day for 7 days then 200mg daily but that 200mg dose to start in 5 days.    Gave order to do the full week of Amiodarone 400mg twice a day until 11/17/21 and then 200mg daily starting on the 18th like it states in discharge paperwork.    Electronically signed by Nicole Guardado RN, CNP

## 2021-11-15 ENCOUNTER — TRANSITIONAL CARE UNIT VISIT (OUTPATIENT)
Dept: GERIATRICS | Facility: CLINIC | Age: 61
End: 2021-11-15
Payer: COMMERCIAL

## 2021-11-15 VITALS
HEIGHT: 76 IN | OXYGEN SATURATION: 95 % | WEIGHT: 258.4 LBS | RESPIRATION RATE: 16 BRPM | DIASTOLIC BLOOD PRESSURE: 69 MMHG | TEMPERATURE: 98.3 F | HEART RATE: 86 BPM | SYSTOLIC BLOOD PRESSURE: 127 MMHG | BODY MASS INDEX: 31.47 KG/M2

## 2021-11-15 DIAGNOSIS — I50.33 ACUTE ON CHRONIC DIASTOLIC CONGESTIVE HEART FAILURE (H): ICD-10-CM

## 2021-11-15 DIAGNOSIS — K59.01 SLOW TRANSIT CONSTIPATION: ICD-10-CM

## 2021-11-15 DIAGNOSIS — E87.1 HYPONATREMIA: ICD-10-CM

## 2021-11-15 DIAGNOSIS — N17.9 ACUTE KIDNEY INJURY (H): ICD-10-CM

## 2021-11-15 DIAGNOSIS — R05.3 CHRONIC COUGH: ICD-10-CM

## 2021-11-15 DIAGNOSIS — K68.12 PSOAS ABSCESS (H): Primary | ICD-10-CM

## 2021-11-15 DIAGNOSIS — G47.33 OSA (OBSTRUCTIVE SLEEP APNEA): ICD-10-CM

## 2021-11-15 DIAGNOSIS — I25.10 CORONARY ARTERY DISEASE INVOLVING NATIVE CORONARY ARTERY OF NATIVE HEART WITHOUT ANGINA PECTORIS: ICD-10-CM

## 2021-11-15 DIAGNOSIS — I10 ESSENTIAL HYPERTENSION: ICD-10-CM

## 2021-11-15 DIAGNOSIS — R53.81 PHYSICAL DECONDITIONING: ICD-10-CM

## 2021-11-15 DIAGNOSIS — A49.02 MRSA INFECTION: ICD-10-CM

## 2021-11-15 DIAGNOSIS — E11.65 UNCONTROLLED TYPE 2 DIABETES MELLITUS WITH HYPERGLYCEMIA (H): ICD-10-CM

## 2021-11-15 DIAGNOSIS — I48.91 ATRIAL FIBRILLATION WITH RVR (H): ICD-10-CM

## 2021-11-15 DIAGNOSIS — M54.50 CHRONIC BILATERAL LOW BACK PAIN, UNSPECIFIED WHETHER SCIATICA PRESENT: ICD-10-CM

## 2021-11-15 DIAGNOSIS — N40.1 BENIGN PROSTATIC HYPERPLASIA WITH URINARY RETENTION: ICD-10-CM

## 2021-11-15 DIAGNOSIS — I21.4 NSTEMI (NON-ST ELEVATED MYOCARDIAL INFARCTION) (H): ICD-10-CM

## 2021-11-15 DIAGNOSIS — R33.8 BENIGN PROSTATIC HYPERPLASIA WITH URINARY RETENTION: ICD-10-CM

## 2021-11-15 DIAGNOSIS — G89.29 CHRONIC BILATERAL LOW BACK PAIN, UNSPECIFIED WHETHER SCIATICA PRESENT: ICD-10-CM

## 2021-11-15 DIAGNOSIS — Z97.8 FOLEY CATHETER IN PLACE: ICD-10-CM

## 2021-11-15 LAB
ALBUMIN SERPL-MCNC: 1.1 G/DL (ref 3.4–5)
ALP SERPL-CCNC: 194 U/L (ref 40–150)
ALT SERPL W P-5'-P-CCNC: 84 U/L (ref 0–70)
AST SERPL W P-5'-P-CCNC: 92 U/L (ref 0–45)
BASOPHILS # BLD AUTO: 0.1 10E3/UL (ref 0–0.2)
BASOPHILS NFR BLD AUTO: 1 %
BILIRUB DIRECT SERPL-MCNC: 0.3 MG/DL (ref 0–0.2)
BILIRUB SERPL-MCNC: 0.7 MG/DL (ref 0.2–1.3)
CREAT SERPL-MCNC: 0.98 MG/DL (ref 0.66–1.25)
CRP SERPL HS-MCNC: 94.6 MG/L
EOSINOPHIL # BLD AUTO: 0.2 10E3/UL (ref 0–0.7)
EOSINOPHIL NFR BLD AUTO: 2 %
ERYTHROCYTE [DISTWIDTH] IN BLOOD BY AUTOMATED COUNT: 13.2 % (ref 10–15)
GFR SERPL CREATININE-BSD FRML MDRD: 82 ML/MIN/1.73M2
HCT VFR BLD AUTO: 31.6 % (ref 40–53)
HGB BLD-MCNC: 10.2 G/DL (ref 13.3–17.7)
IMM GRANULOCYTES # BLD: 0.1 10E3/UL
IMM GRANULOCYTES NFR BLD: 1 %
LYMPHOCYTES # BLD AUTO: 1.7 10E3/UL (ref 0.8–5.3)
LYMPHOCYTES NFR BLD AUTO: 15 %
MCH RBC QN AUTO: 28.4 PG (ref 26.5–33)
MCHC RBC AUTO-ENTMCNC: 32.3 G/DL (ref 31.5–36.5)
MCV RBC AUTO: 88 FL (ref 78–100)
MONOCYTES # BLD AUTO: 1.3 10E3/UL (ref 0–1.3)
MONOCYTES NFR BLD AUTO: 11 %
NEUTROPHILS # BLD AUTO: 8.2 10E3/UL (ref 1.6–8.3)
NEUTROPHILS NFR BLD AUTO: 70 %
NRBC # BLD AUTO: 0 10E3/UL
NRBC BLD AUTO-RTO: 0 /100
PLATELET # BLD AUTO: 527 10E3/UL (ref 150–450)
PROT SERPL-MCNC: 6.2 G/DL (ref 6.8–8.8)
RBC # BLD AUTO: 3.59 10E6/UL (ref 4.4–5.9)
VANCOMYCIN SERPL-MCNC: 9.6 MG/L
WBC # BLD AUTO: 11.6 10E3/UL (ref 4–11)

## 2021-11-15 PROCEDURE — 82565 ASSAY OF CREATININE: CPT | Performed by: NURSE PRACTITIONER

## 2021-11-15 PROCEDURE — 80076 HEPATIC FUNCTION PANEL: CPT | Performed by: NURSE PRACTITIONER

## 2021-11-15 PROCEDURE — 36415 COLL VENOUS BLD VENIPUNCTURE: CPT | Performed by: NURSE PRACTITIONER

## 2021-11-15 PROCEDURE — 86141 C-REACTIVE PROTEIN HS: CPT | Performed by: NURSE PRACTITIONER

## 2021-11-15 PROCEDURE — 99310 SBSQ NF CARE HIGH MDM 45: CPT | Performed by: NURSE PRACTITIONER

## 2021-11-15 PROCEDURE — 85025 COMPLETE CBC W/AUTO DIFF WBC: CPT | Performed by: NURSE PRACTITIONER

## 2021-11-15 PROCEDURE — 80202 ASSAY OF VANCOMYCIN: CPT | Performed by: NURSE PRACTITIONER

## 2021-11-15 RX ORDER — LANOLIN ALCOHOL/MO/W.PET/CERES
6 CREAM (GRAM) TOPICAL
COMMUNITY
Start: 2021-11-15 | End: 2021-11-29

## 2021-11-15 RX ORDER — INSULIN LISPRO 100 [IU]/ML
10 INJECTION, SOLUTION INTRAVENOUS; SUBCUTANEOUS
COMMUNITY
Start: 2021-11-15

## 2021-11-15 RX ORDER — METOPROLOL SUCCINATE 25 MG/1
50 TABLET, EXTENDED RELEASE ORAL DAILY
COMMUNITY
Start: 2021-11-15

## 2021-11-15 RX ORDER — TAMSULOSIN HYDROCHLORIDE 0.4 MG/1
0.4 CAPSULE ORAL AT BEDTIME
COMMUNITY
Start: 2021-11-15

## 2021-11-15 RX ORDER — POLYETHYLENE GLYCOL 3350 17 G/17G
1 POWDER, FOR SOLUTION ORAL DAILY PRN
COMMUNITY
Start: 2021-11-15 | End: 2021-11-29

## 2021-11-15 RX ORDER — AMOXICILLIN 250 MG
2 CAPSULE ORAL 2 TIMES DAILY PRN
COMMUNITY
Start: 2021-11-15 | End: 2021-11-29

## 2021-11-15 RX ORDER — FLUTICASONE PROPIONATE 50 MCG
2 SPRAY, SUSPENSION (ML) NASAL DAILY PRN
COMMUNITY
Start: 2021-11-15 | End: 2021-11-29

## 2021-11-15 RX ORDER — ASPIRIN 81 MG/1
81 TABLET, CHEWABLE ORAL EVERY MORNING
COMMUNITY
Start: 2021-11-15 | End: 2021-11-29

## 2021-11-15 RX ORDER — METHOCARBAMOL 500 MG/1
500 TABLET, FILM COATED ORAL 3 TIMES DAILY PRN
COMMUNITY
Start: 2021-11-15 | End: 2021-11-29

## 2021-11-15 RX ORDER — FUROSEMIDE 40 MG
40 TABLET ORAL EVERY MORNING
COMMUNITY
Start: 2021-11-15

## 2021-11-15 RX ORDER — OXYCODONE HYDROCHLORIDE 5 MG/1
5-10 TABLET ORAL EVERY 6 HOURS PRN
COMMUNITY
Start: 2021-11-15 | End: 2021-11-26

## 2021-11-15 RX ORDER — SODIUM CHLORIDE 9 MG/ML
10 INJECTION, SOLUTION INTRAVENOUS SEE ADMIN INSTRUCTIONS
COMMUNITY
Start: 2021-11-15

## 2021-11-15 RX ORDER — NITROGLYCERIN 0.4 MG/1
0.4 TABLET SUBLINGUAL EVERY 5 MIN PRN
COMMUNITY
Start: 2021-11-15

## 2021-11-15 RX ORDER — CLOPIDOGREL BISULFATE 75 MG/1
75 TABLET ORAL EVERY MORNING
COMMUNITY
Start: 2021-11-15

## 2021-11-15 RX ORDER — BISACODYL 10 MG
10 SUPPOSITORY, RECTAL RECTAL DAILY PRN
COMMUNITY
Start: 2021-11-15 | End: 2021-11-29

## 2021-11-15 RX ORDER — ACETAMINOPHEN 500 MG
1000 TABLET ORAL EVERY 6 HOURS PRN
COMMUNITY
Start: 2021-11-15 | End: 2021-11-15

## 2021-11-15 ASSESSMENT — MIFFLIN-ST. JEOR: SCORE: 2073.59

## 2021-11-15 NOTE — LETTER
11/15/2021        RE: Kartik Swain  [address] [facility] [address]        [facility]  Primary Care Provider & Clinic: GULSHAN FLAHERTY, [address] [address]; Phone: [phone]; Fax: [phone]  Chief Complaint   Patient presents with     Hospital F/U     [facility] 11/4/2021 - 11/13/2021     [facility] Record Number: [id number]  Place of Service Where Encounter Took Place: [facility] [place] [facility] ([facility]) [[id number]]    Kartik Swain is a 61 year old (1960), admitted to the above facility from  [facility] . Hospital stay 11/4/21 through 11/13/21.    HPI:    Kartik Swain is a 62 yo male, PMH DM2, CORAL, HTN, and chronic pain, who was initially admitted for NSTEMI, but found to have MRSA bacteremia and concern for psoas abscess/fluid collection on lumbar MRI. I&D on 11/7, hemovac placed on 11/7. Removed on 11/8.. PICC Line placed on 11/11. Per ID will need 4-6 weeks of IV abx. Echo with EF of 40%, was diuresed with IV lasix. Developed with Afib RVR, cardiology consulted. Angiogram on 11/9. 2 stents placed. Started on DAPT x 1 week + apixaban. Developed MASON, losartan put on hold. Developed urinary retention, felt due to BPH. Lyrica held briefly due to MASON. Worsening back pain, per neurosurgery hardware appropriately seated, briefly on tramadol and oxycodone, Lyrica and flexeril resumed. Endocrine followed for BG management as recent hospitalization for mild DKA. When medically stable was discharged to TCU for further rehab and medical management.     Patient seen today resting in bed. Daughter is present during visit. He was noted to have some dark, red tinged urine this AM. On exam it is clearing, suspect catheter may have been tugged and he had some bleeding due multiple blood thinners. Pain is 2-3/10 currently, had a lot of pain earlier when staff was repositioning him. He report chronic back pain, uses oxycodone very rarely (may  40/year) at home He does try to stay active. Voice has been very soft since his surgery, denies sore throat, has been sucking on lozenges and drinking fluids. He says he feels very weak, but was able to sit at the bedside and transfer with 1-2 person assist yesterday and feels very happy about this.     CODE STATUS/ADVANCE DIRECTIVES DISCUSSION: No Order - CPR/Full code   Patient's living condition: lives alone  ALLERGIES: No Known Allergies   PAST MEDICAL HISTORY:   Past Medical History:   Diagnosis Date     Depressive disorder, not elsewhere classified      Other specified idiopathic peripheral neuropathy     Peripheral Neuropathy      Type II or unspecified type diabetes mellitus without mention of complication, not stated as uncontrolled     DM      PAST SURGICAL HISTORY:  has a past surgical history that includes surgical history of -  (1980, 1982); Pr Arthrodesis Ant Interbody Min Discectomy,Lumbar; AMPUTATION TOE,MT-P JT; [place] Tooth Extraction (Left); and [place] Tooth Extraction (Right).  FAMILY HISTORY: family history includes Breast Cancer (age of onset: 44.00) in his mother; Cancer (age of onset: 37.00) in his brother; Heart Disease in his father; No Known Problems in his daughter, son, and son.  SOCIAL HISTORY:  reports that he has never smoked. He has never used smokeless tobacco. He reports current alcohol use. He reports that he does not use drugs.    Post Discharge Medication Reconciliation Status: discharge medications reconciled and changed, per note/orders  Current Outpatient Medications   Medication Sig     acetaminophen 325 MG TABS Take 650 mg by mouth 3 times daily     alteplase 2 mg/2 mL (in 10 mL syringe) Inject 2 mg into the vein daily as needed As needed for occluded IV - instill 2 mg and after 20 minutes may repeat 2 mg dose     [START ON 11/18/2021] amiodarone (PACERONE) 200 MG tablet Take 1 tablet (200 mg) by mouth daily     amiodarone (PACERONE) 400 MG tablet Take 1 tablet (400 mg) by  mouth 2 times daily for 4 days     apixaban ANTICOAGULANT (ELIQUIS) 5 MG tablet Take 5 mg by mouth 2 times daily     aspirin (ASA) 81 MG chewable tablet Take 81 mg by mouth every morning     atorvastatin (LIPITOR) 40 MG tablet Take 40 mg by mouth At Bedtime      bisacodyl (DULCOLAX) 10 MG suppository Place 10 mg rectally daily as needed for constipation     clopidogrel (PLAVIX) 75 MG tablet Take 75 mg by mouth every morning     DM-Doxylamine-Acetaminophen (NYQUIL HBP COLD & FLU) 15-6. MG/15ML LIQD Take 30 mLs by mouth At Bedtime     fluticasone (FLONASE) 50 MCG/ACT nasal spray Spray 2 sprays into both nostrils daily as needed for rhinitis or allergies     furosemide (LASIX) 40 MG tablet Take 40 mg by mouth every morning     GLUCOPHAGE 1000 MG OR TABS 1 TABLET TWICE DAILY     insulin glargine (LANTUS PEN) 100 UNIT/ML pen Inject 40 Units Subcutaneous 2 times daily     insulin lispro (HUMALOG KWIKPEN) 100 UNIT/ML (1 unit dial) KWIKPEN Inject 10 Units Subcutaneous 3 times daily (before meals)     melatonin 3 MG tablet Take 6 mg by mouth nightly as needed for sleep     methocarbamol (ROBAXIN) 500 MG tablet Take 500 mg by mouth 3 times daily as needed for muscle spasms     metoprolol succinate ER (TOPROL-XL) 25 MG 24 hr tablet Take 25 mg by mouth daily     MULTIPLE VITAMINS-MINERALS PO Take 1 tablet by mouth every morning     nitroGLYcerin (NITROSTAT) 0.4 MG sublingual tablet Place 0.4 mg under the tongue every 5 minutes as needed for chest pain For chest pain place 1 tablet under the tongue every 5 minutes for 3 doses. If symptoms persist 5 minutes after 1st dose call 911.     ONE TOUCH ULTRA TEST VI STRP QID AND PRN     oxyCODONE (ROXICODONE) 5 MG tablet Take 5-10 mg by mouth every 6 hours as needed for pain 1 tab (5 mg) PO Q6H PRN for pain 1-5/10 AND 2 tabs (10 mg) PO Q6H PRN for pain 6-10/10     polyethylene glycol (MIRALAX) 17 GM/Dose powder Take 1 capful by mouth daily as needed for constipation     pregabalin  "(LYRICA) 100 MG capsule Take 100 mg by mouth 3 times daily      senna-docusate (SENOKOT-S/PERICOLACE) 8.6-50 MG tablet Take 2 tablets by mouth 2 times daily as needed for constipation     sodium chloride 0.9% infusion Inject 10 mLs into the vein See Admin Instructions IV flush 10 mL as needed - pre and post IV abx and after blood draws     tamsulosin (FLOMAX) 0.4 MG capsule Take 0.4 mg by mouth At Bedtime     TUSSIN GUAIFENESIN OR Take 400 mg by mouth every 4 hours as needed (cough) For 3 days - ends 11/17/2021     VANCOMYCIN HCL IV Inject 1,750 mg into the vein every morning     LOSARTAN POTASSIUM PO Take 25 mg by mouth daily (Patient not taking: Reported on 11/15/2021)     No current facility-administered medications for this visit.     ROS:  4 point ROS including Respiratory, CV, GI and , other than that noted in the HPI,  is negative    Vitals:  /69   Pulse 86   Temp 98.3  F (36.8  C)   Resp 16   Ht 1.93 m (6' 4\")   Wt 117.2 kg (258 lb 6.4 oz)   SpO2 95%   BMI 31.45 kg/m    Exam:  GENERAL APPEARANCE:  Alert, in no distress, cooperative  RESP:  lungs clear to auscultation , no respiratory distress  CV:  Palpation and auscultation of heart done , regular rate and rhythm, no murmur, rub, or gallop, peripheral edema 1-2+ in BLE  ABDOMEN:  bowel sounds normal, soft, non-tender, eli draining clear yellow urine with sediment  M/S:   no gross joint deformities  SKIN:  moderate clear drainage from left addominal incisions, sutures intact, right going insertion site scabbed, no bruising  NEURO:   Cn 2-12 grossly intact, speech soft  PSYCH:  oriented X 3, affect and mood normal    Lab/Diagnostic data:  Recent labs in [facility] reviewed by me today.  Labs in Epic chart marked for merge.    ASSESSMENT/PLAN:  Psoas abscess (H)  MRSA infection  Noted on MRI with SI joint involvement, + Blood cultures with MRSA on 11/4 and 11/5-6; negative on 11/7 and 11/9. S/p I&D on 11/7, Hemovac out on 11/8, continues to have " some drainage from surgical drain site.  Discussed with patient, nursing staff, IDT team  - continue current wound care, change PRN for drainage  - IV vanco x 4- 6 weeks - Infusion pharmacy to dose  - continue current PICC line cares.   - Weekly Vanco level, CBC, creatinine, GFR, CRP and LFT's, faxed to ID.   - follow-up with ID on 12/9  - follow-up with ortho on 12/2    Uncontrolled type 2 diabetes mellitus with hyperglycemia (H)  Most recent A1C was 10.9%, recent hospitalization for DKA,  Discussed with patient, nursing staff.   - continue glargine 40 units daily, lispo 10 units TID with meals, metformin 1000mg BID.   - QID BGT, continue to monitor and adjust    Coronary artery disease involving native coronary artery of native heart without angina pectoris  NSTEMI (non-ST elevated myocardial infarction) (H)  Acute on chronic diastolic congestive heart failure (H)  Essential hypertension  Fluid overload while IP, aggressively diureised. EF 40%, Angiogram on 11/9, s/p 2 stents to proximal and mid-LAD. Angio site healing well.  Discussed with patient, nursing staff. Limited data to trend BP and weights.   - Renal function stable today, ok to resume losartan 25mg daily  - continue ASA x 1 week, continue Plavix 75mg daily, lasix 40mg daily, atorvastatin 40mg at bedtime, metoprolol CL 25mg daily,   - daily weights  - follow-up with cardiology in 4 weeks, continue to monitor and adjust       Atrial fibrillation with RVR (H)  Presented on 11/5 with -150. Cardiology consulted. HR in TCU 70-90.  Discussed with patient, nursing staff.   - new on apixaban 5mg BID  - amiodarone 400mg BID through 11/17 then 200mg daily  - metoprolol XL 25mg daily.   - follow-up with cardiology in 4 weeks    Benign prostatic hyperplasia with urinary retention  Eli catheter in place  New eli catheter while IP due to urinary retention, new on flomax. Patient requesting to attempt voiding trial in TCU.  Discussed with patient, nursing  staff.   - continue tamsulosin at bedtime  - will attempt voiding trial at future date in TCU once mobility improves    Chronic bilateral low back pain, unspecified whether sciatica present  Chronic, currently having acute post op pain.  Discussed with patient, nursing staff.   - schedule APAP 650mg TID - will not schedule at HS as Nyquil at HS has APAP in it  - oxycodone 5-10mg q4h PRN, Lyrica 100mg TID,   - biofreeze BID and BID prn to low back/hip    Slow transit constipation  Report some constipation.  Discussed with patient, nursing staff.   - continue miralax daily PRN, senna-s 2 tab BID PRN. Continue to monitor and adjust     CORAL (obstructive sleep apnea)  - does not tolerate CPAP    Acute kidney injury (H)  Hyponatremia  In the setting of diuresis, obstructive uropathy. Creatinine baseline ~1 on labs today  - resume losartan 25mg daily  - weekly creatinine/GFR while on Vanco.     Chronic cough  Patient report chronic, would like to use Nyquil daughter has supplied   - ok to use Nyquil family supplied at bedtime - does have APAP in it so will dose around this    Physical deconditioning  In the setting of acute illness, prolonged hospitalization. Goals is to discharge home  Discussed with patient, nursing staff, therapy, IDT team  - PT/OT eval and treat. Discharge planning per their recommendation  -  to assist with discharge planning.         Orders:  1. Change APAP to 650mg PO tid, do not schedule at hs  2. nyquil (patient supplied) 30mg PO at bedtime (contains APAP)  3. Resume losartan 25mg po q day DX htn, chf  4. Hold urology consult, will attempt voiding trail in TCU - date TBD  5. Daily weight  6. biofreeze BID and BID PRN to low back/hip for pain     Total time spent with patient visit at the skilled nursing facility was 40 minutes including patient visit and review of past records. Greater than 50% of total time spent with counseling and coordinating care due to review of medications,  discussion fo plan of care and patient education d/t psoas abscess, acute and chronic pain, NSTEMI, CKD, CHF, HTN, Afib new on blood thinners, urinary retention with eli, deconditioning,  And DM2.     Electronically signed by: LO Chicas CNP        Sincerely,        LO Chicas CNP

## 2021-11-17 NOTE — PROGRESS NOTES
[facility]  PRIMARY CARE PROVIDER AND CLINIC:  GULSHNA FLAHERTY, [facility] [address] [facility] [address]*  Chief Complaint   Patient presents with     Hospital F/U     [place] [facility] Record Number:  [id number]  Place of Service where encounter took place:  [facility] [place] [facility] ([facility]) [[id number]]    Kartik Swain  is a 61 year old  (1960), admitted to the above facility from  [facility] . Hospital stay 11/4/21 through 11/13/21..  Admitted to this facility for  rehab, medical management and nursing care.    HPI:    HPI information obtained from: facility chart records, facility staff, patient report and [facility] chart review.   Brief Summary of Hospital Course:   * Pt with PMH of CAD, chronic back pain, BPH, T2D, hospitalized with MRSA bacteremia 2/2 psoas abscess s/p ID, started on IV abx (4-6 weeks), echo vegetative for vegetation  * hospital stay complicated with:   - A-fib RVR, and NSTEMI, s/p stenting x2, placed on DOA and eliquis   - MASON and urinary retention   - volume overload, treated with IV diuretics.    - worsening back pain, imaging revealed stable hardware. Managed conservatively.    -     Today:  - Psoas abscess: GNP reports an increased drainage from the surgical site, and the surgical team has been updated.  - T2D: GNP reports Glargine was reduced on 11/17. Patient reports that his blood sugar dropped to 40's yesterday, and was sweaty. Feeling better.   - Heart: Son in the room reports cardiologist stopped the ASA, and will have echo repeat in January.     - Bacteremia: denies fever, chills or pain. Reports it is less draining now.       - rehab: reports he is feeling much better today, and feels he will be leaving home earlier than he thought.     - Jaimes's catheter:  Reports failed twice during hospitalization. Has a follow up with Urologist next Tuesday.    - cough: reports it is getting slightly better with cough suppressant. Mainly  happens at night. Cardiology felt it could be due to acid reflux as per his son, and started Osbaldo on omeprazole. RN reports no order was sent. Son added that his father supposed to have cpap started prior to his hospitalization. Osbaldo endorses that he is a mouth-breather and that his mouth gets dry a lot at night.     ===========================    CODE STATUS/ADVANCE DIRECTIVES DISCUSSION:   CPR/Full code   Patient's living condition: lives alone  ALLERGIES: Patient has no known allergies.  PAST MEDICAL HISTORY:  has a past medical history of Depressive disorder, not elsewhere classified, Other specified idiopathic peripheral neuropathy, and Type II or unspecified type diabetes mellitus without mention of complication, not stated as uncontrolled.  PAST SURGICAL HISTORY:   has a past surgical history that includes surgical history of -  (1980, 1982); Pr Arthrodesis Ant Interbody Min Discectomy,Lumbar; AMPUTATION TOE,MT-P JT; [place] Tooth Extraction (Left); and [place] Tooth Extraction (Right).  FAMILY HISTORY: family history includes Breast Cancer (age of onset: 44.00) in his mother; Cancer (age of onset: 37.00) in his brother; Heart Disease in his father; No Known Problems in his daughter, son, and son.  SOCIAL HISTORY:   reports that he has never smoked. He has never used smokeless tobacco. He reports current alcohol use. He reports that he does not use drugs.    Post Discharge Medication Reconciliation Status: discharge medications reconciled and changed, per note/orders  Current Outpatient Medications   Medication Sig Dispense Refill     acetaminophen 325 MG TABS Take 650 mg by mouth 3 times daily       alteplase 2 mg/2 mL (in 10 mL syringe) Inject 2 mg into the vein daily as needed As needed for occluded IV - instill 2 mg and after 20 minutes may repeat 2 mg dose       amiodarone (PACERONE) 200 MG tablet Take 1 tablet (200 mg) by mouth daily       amiodarone (PACERONE) 400 MG tablet Take 1 tablet (400 mg) by mouth  2 times daily for 4 days 8 tablet 0     apixaban ANTICOAGULANT (ELIQUIS) 5 MG tablet Take 5 mg by mouth 2 times daily       aspirin (ASA) 81 MG chewable tablet Take 81 mg by mouth every morning       atorvastatin (LIPITOR) 40 MG tablet Take 40 mg by mouth At Bedtime        bisacodyl (DULCOLAX) 10 MG suppository Place 10 mg rectally daily as needed for constipation       clopidogrel (PLAVIX) 75 MG tablet Take 75 mg by mouth every morning       DM-Doxylamine-Acetaminophen (NYQUIL HBP COLD & FLU) 15-6. MG/15ML LIQD Take 30 mLs by mouth At Bedtime       fluticasone (FLONASE) 50 MCG/ACT nasal spray Spray 2 sprays into both nostrils daily as needed for rhinitis or allergies       furosemide (LASIX) 40 MG tablet Take 40 mg by mouth every morning       GLUCOPHAGE 1000 MG OR TABS 1 TABLET TWICE DAILY 60 2     insulin glargine (LANTUS PEN) 100 UNIT/ML pen Inject 40 Units Subcutaneous 2 times daily       insulin lispro (HUMALOG KWIKPEN) 100 UNIT/ML (1 unit dial) KWIKPEN Inject 10 Units Subcutaneous 3 times daily (before meals)       LOSARTAN POTASSIUM PO Take 25 mg by mouth daily (Patient not taking: Reported on 11/15/2021)       melatonin 3 MG tablet Take 6 mg by mouth nightly as needed for sleep       methocarbamol (ROBAXIN) 500 MG tablet Take 500 mg by mouth 3 times daily as needed for muscle spasms       metoprolol succinate ER (TOPROL-XL) 25 MG 24 hr tablet Take 25 mg by mouth daily       MULTIPLE VITAMINS-MINERALS PO Take 1 tablet by mouth every morning       nitroGLYcerin (NITROSTAT) 0.4 MG sublingual tablet Place 0.4 mg under the tongue every 5 minutes as needed for chest pain For chest pain place 1 tablet under the tongue every 5 minutes for 3 doses. If symptoms persist 5 minutes after 1st dose call 911.       ONE TOUCH ULTRA TEST VI STRP QID AND  PRN 1YR     oxyCODONE (ROXICODONE) 5 MG tablet Take 5-10 mg by mouth every 6 hours as needed for pain 1 tab (5 mg) PO Q6H PRN for pain 1-5/10 AND 2 tabs (10 mg)  "PO Q6H PRN for pain 6-10/10       polyethylene glycol (MIRALAX) 17 GM/Dose powder Take 1 capful by mouth daily as needed for constipation       pregabalin (LYRICA) 100 MG capsule Take 100 mg by mouth 3 times daily        senna-docusate (SENOKOT-S/PERICOLACE) 8.6-50 MG tablet Take 2 tablets by mouth 2 times daily as needed for constipation       sodium chloride 0.9% infusion Inject 10 mLs into the vein See Admin Instructions IV flush 10 mL as needed - pre and post IV abx and after blood draws       tamsulosin (FLOMAX) 0.4 MG capsule Take 0.4 mg by mouth At Bedtime       VANCOMYCIN HCL IV Inject 1,750 mg into the vein every morning         ROS: 10 point ROS of systems including Constitutional, Eyes, Respiratory, Cardiovascular, Gastroenterology, Genitourinary, Integumentary, Musculoskeletal, Psychiatric were all negative except for pertinent positives noted in my HPI.    Vitals:  /64   Pulse 87   Temp 98.8  F (37.1  C)   Resp 18   Ht 1.93 m (6' 4\")   Wt 117.5 kg (259 lb)   SpO2 98%   BMI 31.53 kg/m    Exam:  GENERAL APPEARANCE:  in no distress, cooperative  ENT:  Mouth and posterior oropharynx normal, moist mucous membranes, oral mucosa moist, no lesion noted.   EYES:  EOMI, Pupil rounded and equal.  RESP:  lungs clear to auscultation   CV:  S1S2 audible, regular HR, no murmur appreciated.   ABDOMEN:  soft, NT/ND, BS audible. no mass appreciated on palpation.   M/S:   no joint deformity noted on observation.   SKIN:  surgical dressing over RLQ, dry. PICC line over RUE.   NEURO: slightly hypophonic voice.   No NFD appreciated on observation. Hand  5/5 b/l  PSYCH:  normal insight, judgement and memory, affect and mood normal      Lab/Diagnostic data: Reviewed in the chart and EHR.        ASSESSMENT/PLAN:  ----------------------------  * Pt with PMH of CAD, chronic back pain, BPH, hospitalized with MRSA bacteremia 2/2 psoas abscess s/p ID, started on IV abx (4-6 weeks), echo vegetative for vegetation  * " hospital stay complicated with:   - A-fib RVR, and NSTEMI, s/p stenting x2 (proximal and mid LAD;  placed on DOA and eliquis   - MASON and urinary retention, s/p eli's catheter insertion   - volume overload, treated with IV diuretics, EF 40%   - worsening back pain, imaging revealed stable hardware. Managed conservatively.    - evaluated by PMR and felt to benefit from TCU placement.      - on vancomycin IV for total 4-6 weeks. Lab weekly, followed by ID team. appreciate help.   - Cardiac wise, stable. Followed by cardiovascular team. Appreciate help.   - Urinary retention, could be 2/2 BPH (obstructive) vs 2/2 anesthesia (Neurogenic). On flomax. Will try another PVR in am, follow [facility] protocol. If fail, send to Urologist appointment next Tuesday.   - started rehab program, making a progress, continue until desired goal is achieved.   - analgesia optimal.   - given patient is on plavix and eliquis with a risk for UGIB and query GERD causing ongoing cough, omeprazole was suggested by Cardiology according to family's report. Will start pantoprazole, given patient is on plavix. This can be re-assess by PCP a few weeks if it is still needed.         Uncontrolled type 2 diabetes mellitus with hyperglycemia (H)  Recent mid DKA  - HbA1C 10.9%. goal is 7-8%.   - lantus reduced due to hypoglycemia from 40 units to 35 units.   - endocrinology follow up on outpatient basis for optimal management.       Order:  - PVR as per PROMISE   - pantoprazole 20 mg daily.       Total time spent with patient visit at the skilled nursing facility was 45 min including patient visit and extensive medical record review. Greater than 50% of total time spent with counseling and coordinating care due to above.     Electronically signed by:  Ian Milelr MD

## 2021-11-18 ENCOUNTER — LAB REQUISITION (OUTPATIENT)
Dept: LAB | Facility: CLINIC | Age: 61
End: 2021-11-18

## 2021-11-18 ENCOUNTER — TRANSITIONAL CARE UNIT VISIT (OUTPATIENT)
Dept: GERIATRICS | Facility: CLINIC | Age: 61
End: 2021-11-18
Payer: COMMERCIAL

## 2021-11-18 VITALS
WEIGHT: 259 LBS | RESPIRATION RATE: 18 BRPM | HEIGHT: 76 IN | HEART RATE: 87 BPM | BODY MASS INDEX: 31.54 KG/M2 | SYSTOLIC BLOOD PRESSURE: 112 MMHG | DIASTOLIC BLOOD PRESSURE: 64 MMHG | TEMPERATURE: 98.8 F | OXYGEN SATURATION: 98 %

## 2021-11-18 DIAGNOSIS — I10 ESSENTIAL HYPERTENSION: ICD-10-CM

## 2021-11-18 DIAGNOSIS — E11.9 TYPE 2 DIABETES MELLITUS WITHOUT COMPLICATIONS (H): ICD-10-CM

## 2021-11-18 DIAGNOSIS — K21.00 GASTROESOPHAGEAL REFLUX DISEASE WITH ESOPHAGITIS WITHOUT HEMORRHAGE: Primary | ICD-10-CM

## 2021-11-18 DIAGNOSIS — E11.65 UNCONTROLLED TYPE 2 DIABETES MELLITUS WITH HYPERGLYCEMIA (H): ICD-10-CM

## 2021-11-18 DIAGNOSIS — K68.12 PSOAS ABSCESS (H): ICD-10-CM

## 2021-11-18 DIAGNOSIS — N13.9 OBSTRUCTIVE UROPATHY: ICD-10-CM

## 2021-11-18 DIAGNOSIS — I25.10 CORONARY ARTERY DISEASE INVOLVING NATIVE CORONARY ARTERY OF NATIVE HEART WITHOUT ANGINA PECTORIS: ICD-10-CM

## 2021-11-18 DIAGNOSIS — A49.02 MRSA INFECTION: ICD-10-CM

## 2021-11-18 DIAGNOSIS — I48.20 CHRONIC ATRIAL FIBRILLATION (H): ICD-10-CM

## 2021-11-18 LAB
CREAT SERPL-MCNC: 1 MG/DL (ref 0.66–1.25)
GFR SERPL CREATININE-BSD FRML MDRD: 80 ML/MIN/1.73M2
VANCOMYCIN SERPL-MCNC: 12.8 MG/L

## 2021-11-18 PROCEDURE — 82565 ASSAY OF CREATININE: CPT | Performed by: FAMILY MEDICINE

## 2021-11-18 PROCEDURE — 80202 ASSAY OF VANCOMYCIN: CPT | Performed by: FAMILY MEDICINE

## 2021-11-18 PROCEDURE — 99207 PR CDG-CODE INCORRECT PER BILLING BASED ON TIME: CPT | Performed by: FAMILY MEDICINE

## 2021-11-18 PROCEDURE — P9604 ONE-WAY ALLOW PRORATED TRIP: HCPCS | Performed by: FAMILY MEDICINE

## 2021-11-18 PROCEDURE — 36415 COLL VENOUS BLD VENIPUNCTURE: CPT | Performed by: FAMILY MEDICINE

## 2021-11-18 PROCEDURE — 99305 1ST NF CARE MODERATE MDM 35: CPT | Performed by: FAMILY MEDICINE

## 2021-11-18 RX ORDER — PANTOPRAZOLE SODIUM 20 MG/1
20 TABLET, DELAYED RELEASE ORAL DAILY
Start: 2021-11-18

## 2021-11-18 ASSESSMENT — MIFFLIN-ST. JEOR: SCORE: 2076.32

## 2021-11-18 NOTE — LETTER
11/18/2021        RE: Kartik Swain  [address] [facility] [address]        [facility]  PRIMARY CARE PROVIDER AND CLINIC:  GULSHAN FLAHERTY, [address] / [facility]*  Chief Complaint   Patient presents with     Hospital F/U     [place] Medical Record Number:  [id number]  Place of Service where encounter took place:  [facility]) [[id number]]    Kartik Swain  is a 61 year old  (1960), admitted to the above facility from  [facility] . Hospital stay 11/4/21 through 11/13/21..  Admitted to this facility for  rehab, medical management and nursing care.    HPI:    HPI information obtained from: facility chart records, facility staff, patient report and [facility] chart review.   Brief Summary of Hospital Course:   * Pt with PMH of CAD, chronic back pain, BPH, T2D, hospitalized with MRSA bacteremia 2/2 psoas abscess s/p ID, started on IV abx (4-6 weeks), echo vegetative for vegetation  * hospital stay complicated with:   - A-fib RVR, and NSTEMI, s/p stenting x2, placed on DOA and eliquis   - MASON and urinary retention   - volume overload, treated with IV diuretics.    - worsening back pain, imaging revealed stable hardware. Managed conservatively.    -     Today:  - Psoas abscess: GNP reports an increased drainage from the surgical site, and the surgical team has been updated.  - T2D: GNP reports Glargine was reduced on 11/17. Patient reports that his blood sugar dropped to 40's yesterday, and was sweaty. Feeling better.   - Heart: Son in the room reports cardiologist stopped the ASA, and will have echo repeat in January.     - Bacteremia: denies fever, chills or pain. Reports it is less draining now.       - rehab: reports he is feeling much better today, and feels he will be leaving home earlier than he thought.     - Jaimes's catheter:  Reports failed twice during hospitalization. Has a follow up with Urologist next  Tuesday.    - cough: reports it is getting slightly better with cough suppressant. Mainly happens at night. Cardiology felt it could be due to acid reflux as per his son, and started Osbaldo on omeprazole. RN reports no order was sent. Son added that his father supposed to have cpap started prior to his hospitalization. Osbaldo endorses that he is a mouth-breather and that his mouth gets dry a lot at night.     ===========================    CODE STATUS/ADVANCE DIRECTIVES DISCUSSION:   CPR/Full code   Patient's living condition: lives alone  ALLERGIES: Patient has no known allergies.  PAST MEDICAL HISTORY:  has a past medical history of Depressive disorder, not elsewhere classified, Other specified idiopathic peripheral neuropathy, and Type II or unspecified type diabetes mellitus without mention of complication, not stated as uncontrolled.  PAST SURGICAL HISTORY:   has a past surgical history that includes surgical history of -  (1980, 1982); Pr Arthrodesis Ant Interbody Min Discectomy,Lumbar; AMPUTATION TOE,MT-P JT; [place] Tooth Extraction (Left); and [place] Tooth Extraction (Right).  FAMILY HISTORY: family history includes Breast Cancer (age of onset: 44.00) in his mother; Cancer (age of onset: 37.00) in his brother; Heart Disease in his father; No Known Problems in his daughter, son, and son.  SOCIAL HISTORY:   reports that he has never smoked. He has never used smokeless tobacco. He reports current alcohol use. He reports that he does not use drugs.    Post Discharge Medication Reconciliation Status: discharge medications reconciled and changed, per note/orders  Current Outpatient Medications   Medication Sig Dispense Refill     acetaminophen 325 MG TABS Take 650 mg by mouth 3 times daily       alteplase 2 mg/2 mL (in 10 mL syringe) Inject 2 mg into the vein daily as needed As needed for occluded IV - instill 2 mg and after 20 minutes may repeat 2 mg dose       amiodarone (PACERONE) 200 MG tablet Take 1 tablet (200 mg)  by mouth daily       amiodarone (PACERONE) 400 MG tablet Take 1 tablet (400 mg) by mouth 2 times daily for 4 days 8 tablet 0     apixaban ANTICOAGULANT (ELIQUIS) 5 MG tablet Take 5 mg by mouth 2 times daily       aspirin (ASA) 81 MG chewable tablet Take 81 mg by mouth every morning       atorvastatin (LIPITOR) 40 MG tablet Take 40 mg by mouth At Bedtime        bisacodyl (DULCOLAX) 10 MG suppository Place 10 mg rectally daily as needed for constipation       clopidogrel (PLAVIX) 75 MG tablet Take 75 mg by mouth every morning       DM-Doxylamine-Acetaminophen (NYQUIL HBP COLD & FLU) 15-6. MG/15ML LIQD Take 30 mLs by mouth At Bedtime       fluticasone (FLONASE) 50 MCG/ACT nasal spray Spray 2 sprays into both nostrils daily as needed for rhinitis or allergies       furosemide (LASIX) 40 MG tablet Take 40 mg by mouth every morning       GLUCOPHAGE 1000 MG OR TABS 1 TABLET TWICE DAILY 60 2     insulin glargine (LANTUS PEN) 100 UNIT/ML pen Inject 40 Units Subcutaneous 2 times daily       insulin lispro (HUMALOG KWIKPEN) 100 UNIT/ML (1 unit dial) KWIKPEN Inject 10 Units Subcutaneous 3 times daily (before meals)       LOSARTAN POTASSIUM PO Take 25 mg by mouth daily (Patient not taking: Reported on 11/15/2021)       melatonin 3 MG tablet Take 6 mg by mouth nightly as needed for sleep       methocarbamol (ROBAXIN) 500 MG tablet Take 500 mg by mouth 3 times daily as needed for muscle spasms       metoprolol succinate ER (TOPROL-XL) 25 MG 24 hr tablet Take 25 mg by mouth daily       MULTIPLE VITAMINS-MINERALS PO Take 1 tablet by mouth every morning       nitroGLYcerin (NITROSTAT) 0.4 MG sublingual tablet Place 0.4 mg under the tongue every 5 minutes as needed for chest pain For chest pain place 1 tablet under the tongue every 5 minutes for 3 doses. If symptoms persist 5 minutes after 1st dose call 911.       ONE TOUCH ULTRA TEST VI STRP QID AND  PRN 1YR     oxyCODONE (ROXICODONE) 5 MG tablet Take 5-10 mg by mouth  "every 6 hours as needed for pain 1 tab (5 mg) PO Q6H PRN for pain 1-5/10 AND 2 tabs (10 mg) PO Q6H PRN for pain 6-10/10       polyethylene glycol (MIRALAX) 17 GM/Dose powder Take 1 capful by mouth daily as needed for constipation       pregabalin (LYRICA) 100 MG capsule Take 100 mg by mouth 3 times daily        senna-docusate (SENOKOT-S/PERICOLACE) 8.6-50 MG tablet Take 2 tablets by mouth 2 times daily as needed for constipation       sodium chloride 0.9% infusion Inject 10 mLs into the vein See Admin Instructions IV flush 10 mL as needed - pre and post IV abx and after blood draws       tamsulosin (FLOMAX) 0.4 MG capsule Take 0.4 mg by mouth At Bedtime       VANCOMYCIN HCL IV Inject 1,750 mg into the vein every morning         ROS: 10 point ROS of systems including Constitutional, Eyes, Respiratory, Cardiovascular, Gastroenterology, Genitourinary, Integumentary, Musculoskeletal, Psychiatric were all negative except for pertinent positives noted in my HPI.    Vitals:  /64   Pulse 87   Temp 98.8  F (37.1  C)   Resp 18   Ht 1.93 m (6' 4\")   Wt 117.5 kg (259 lb)   SpO2 98%   BMI 31.53 kg/m    Exam:  GENERAL APPEARANCE:  in no distress, cooperative  ENT:  Mouth and posterior oropharynx normal, moist mucous membranes, oral mucosa moist, no lesion noted.   EYES:  EOMI, Pupil rounded and equal.  RESP:  lungs clear to auscultation   CV:  S1S2 audible, regular HR, no murmur appreciated.   ABDOMEN:  soft, NT/ND, BS audible. no mass appreciated on palpation.   M/S:   no joint deformity noted on observation.   SKIN:  surgical dressing over RLQ, dry. PICC line over RUE.   NEURO: slightly hypophonic voice.   No NFD appreciated on observation. Hand  5/5 b/l  PSYCH:  normal insight, judgement and memory, affect and mood normal      Lab/Diagnostic data: Reviewed in the chart and EHR.        ASSESSMENT/PLAN:  ----------------------------  * Pt with PMH of CAD, chronic back pain, BPH, hospitalized with MRSA bacteremia " 2/2 psoas abscess s/p ID, started on IV abx (4-6 weeks), echo vegetative for vegetation  * hospital stay complicated with:   - A-fib RVR, and NSTEMI, s/p stenting x2 (proximal and mid LAD;  placed on DOA and eliquis   - MASON and urinary retention, s/p eli's catheter insertion   - volume overload, treated with IV diuretics, EF 40%   - worsening back pain, imaging revealed stable hardware. Managed conservatively.    - evaluated by PMR and felt to benefit from TCU placement.      - on vancomycin IV for total 4-6 weeks. Lab weekly, followed by ID team. appreciate help.   - Cardiac wise, stable. Followed by cardiovascular team. Appreciate help.   - Urinary retention, could be 2/2 BPH (obstructive) vs 2/2 anesthesia (Neurogenic). On flomax. Will try another PVR in am, follow [facility] protocol. If fail, send to Urologist appointment next Tuesday.   - started rehab program, making a progress, continue until desired goal is achieved.   - analgesia optimal.   - given patient is on plavix and eliquis with a risk for UGIB and query GERD causing ongoing cough, omeprazole was suggested by Cardiology according to family's report. Will start pantoprazole, given patient is on plavix. This can be re-assess by PCP a few weeks if it is still needed.         Uncontrolled type 2 diabetes mellitus with hyperglycemia (H)  Recent mid DKA  - HbA1C 10.9%. goal is 7-8%.   - lantus reduced due to hypoglycemia from 40 units to 35 units.   - endocrinology follow up on outpatient basis for optimal management.       Order:  - PVR as per [facility]OM[facility]   - pantoprazole 20 mg daily.       Total time spent with patient visit at the skilled nursing facility was 45 min including patient visit and extensive medical record review. Greater than 50% of total time spent with counseling and coordinating care due to above.     Electronically signed by:  Ian Miller MD                 Sincerely,        Ian Miller MD

## 2021-11-19 NOTE — PROGRESS NOTES
"[facility] SERVICES  [place] Medical Record Number: [id number]  Chief Complaint   Patient presents with     RECHECK     HPI: Kartik Swain is a 61 year old (1960), who is being seen today for an episodic care visit at: [facility]) [[id number]]. Today's concern is: Patient with recent hospitalization for for NSTEMI, but found to have MRSA bacteremia and concern for psoas abscess/fluid collection on lumbar MRI. I&D on 11/7. Continues on IV vancomycin for 4-6 weeks. He is feels his mobility is improving, back pain is controlled. He is concerned as he has been having diarrhea - has been passing clear liquid stools. Aide reports that it looks like mucus. Has not passed a brown stools in about 2 days. He denies any belly pain. Appetite has been ok. He has had low BG at night - was 66 on Friday night, was symptomatic with shakes and sweating Has been refusing his evening glargine. His catheter was removed last week, and he he has been voiding without issues. He is also requesting for cough medicine that he can take during the day- does get some relief with Nyquil at night, but feels he needs something for breakthrough coughing.  .     Allergies and PMH/PSH reviewed in [facility] today.    REVIEW OF SYSTEMS:  4 point ROS including Respiratory, CV, GI and , other than that noted in the HPI,  is negative    Objective:   /67   Pulse 84   Temp 98  F (36.7  C)   Resp 18   Ht 1.93 m (6' 4\")   Wt 115.4 kg (254 lb 6.4 oz)   SpO2 96%   BMI 30.97 kg/m    Exam:  GENERAL APPEARANCE:  Alert, in no distress, cooperative  RESP:  lungs clear to auscultation , no respiratory distress  CV:  Palpation and auscultation of heart done , regular rate and rhythm, no murmur, rub, or gallop, no edema  ABDOMEN:  bowel sounds normal, soft, non-tender  M/S:   generalized weakness  SKIN:  wound appearance: RLQ incision CDI  PSYCH:  oriented X 3, affect and mood normal    Labs:   Recent labs in [facility] reviewed " by me today.  Labs copied from chart marked for merge.     Assessment/Plan:  Psoas abscess (H)  MRSA infection  Noted on MRI with SI joint involvement, + Blood cultures with MRSA on 11/4 and 11/5-6; negative on 11/7 and 11/9. S/p I&D on 11/7, Hemovac out on 11/8, continues to have some drainage from surgical drain site.  Labs done today were stable.   - continue current wound care, change PRN for drainage  - IV vanco x 4- 6 weeks - Infusion pharmacy to dose  - continue current PICC line cares.   - Weekly Vanco level, CBC, creatinine, GFR, CRP and LFT's, faxed to ID.   - follow-up with ID on 12/9  - follow-up with ortho on 12/2    Uncontrolled type 2 diabetes mellitus with hyperglycemia (H)  Most recent A1C was 10.9%, recent hospitalization for DKA. Glargine decrease from 40 units to 35 units BID. Having hypoglycemia while in TCU - BG 66 overnight. Has been refusing HS glargine over the weekend. -216 over the weekend  - discontinue HS glargine, continue 35 units q AM,   - lispo 10 units TID with meals, metformin 1000mg BID.   - snack with protein qhs    Essential hypertension  Acute on chronic diastolic congestive heart failure (H)  Fluid overload while IP, aggressively diureised. EF 40%, Angiogram on 11/9, s/p 2 stents to proximal and mid-LAD. Angio site healing well. -120, weight 258-254lbs.  - continue ASA x 1 week, continue Plavix 75mg daily, lasix 40mg daily, atorvastatin 40mg at bedtime, metoprolol XL 25mg daily, losartan 40mg daily  - daily weights  - follow-up with cardiology in 4 weeks, continue to monitor and adjust    Atrial fibrillation with RVR (H)  Presented on 11/5 with -150. Cardiology consulted. HR in TCU 70-90.  Discussed with patient, nursing staff.   - new on apixaban 5mg BID  - amiodarone 400mg BID through 11/17 then 200mg daily  - metoprolol XL 25mg daily.   - follow-up with cardiology in 4 weeks    Diarrhea, unspecified type  Recent history of constipation, now passing clear  "liquid stools - ? Mucus. Discussed multiple possible causes with patient. ? If overflow diarrhea. Is on abx, risk for c.diff. If constipation he is requesting milk of mag as it \"works best.\"  - abdominal x-ray today to assess for stools burden  - continue miralax daily PRN, senna-s 2 tab BID PRN  - add milk of mag PRN,  - send stool to c.diff    Cough  Chronic, some breakthrough symptoms  - family supplied Nyquil at bedtime  - will order robitussin q4 PRN for breakthrough symptoms.       Orders:  1. Abdominal xray today  2. Send stool for c.diff  3. Give snack with protein at HS  4. Diabetic tussin 20ml q4h PRN  5. Milk of mag PRN for constipation  6. discontinue hs glargine, continue 35 units q AM     Electronically signed by: LO Chicas CNP  "

## 2021-11-22 ENCOUNTER — LAB REQUISITION (OUTPATIENT)
Dept: LAB | Facility: CLINIC | Age: 61
End: 2021-11-22

## 2021-11-22 ENCOUNTER — TRANSITIONAL CARE UNIT VISIT (OUTPATIENT)
Dept: GERIATRICS | Facility: CLINIC | Age: 61
End: 2021-11-22
Payer: COMMERCIAL

## 2021-11-22 VITALS
DIASTOLIC BLOOD PRESSURE: 67 MMHG | OXYGEN SATURATION: 96 % | WEIGHT: 254.4 LBS | RESPIRATION RATE: 18 BRPM | HEART RATE: 84 BPM | BODY MASS INDEX: 30.98 KG/M2 | TEMPERATURE: 98 F | HEIGHT: 76 IN | SYSTOLIC BLOOD PRESSURE: 119 MMHG

## 2021-11-22 DIAGNOSIS — I50.33 ACUTE ON CHRONIC DIASTOLIC CONGESTIVE HEART FAILURE (H): ICD-10-CM

## 2021-11-22 DIAGNOSIS — R05.9 COUGH: ICD-10-CM

## 2021-11-22 DIAGNOSIS — I48.91 ATRIAL FIBRILLATION WITH RVR (H): ICD-10-CM

## 2021-11-22 DIAGNOSIS — K68.12 PSOAS ABSCESS (H): Primary | ICD-10-CM

## 2021-11-22 DIAGNOSIS — R19.7 DIARRHEA, UNSPECIFIED TYPE: ICD-10-CM

## 2021-11-22 DIAGNOSIS — E11.9 TYPE 2 DIABETES MELLITUS WITHOUT COMPLICATIONS (H): ICD-10-CM

## 2021-11-22 DIAGNOSIS — E11.65 UNCONTROLLED TYPE 2 DIABETES MELLITUS WITH HYPERGLYCEMIA (H): ICD-10-CM

## 2021-11-22 DIAGNOSIS — A49.02 MRSA INFECTION: ICD-10-CM

## 2021-11-22 DIAGNOSIS — I10 ESSENTIAL HYPERTENSION: ICD-10-CM

## 2021-11-22 LAB
ALBUMIN SERPL-MCNC: 1.3 G/DL (ref 3.4–5)
ALP SERPL-CCNC: 115 U/L (ref 40–150)
ALT SERPL W P-5'-P-CCNC: 54 U/L (ref 0–70)
AST SERPL W P-5'-P-CCNC: 43 U/L (ref 0–45)
BASOPHILS # BLD AUTO: 0.1 10E3/UL (ref 0–0.2)
BASOPHILS NFR BLD AUTO: 1 %
BILIRUB DIRECT SERPL-MCNC: 0.2 MG/DL (ref 0–0.2)
BILIRUB SERPL-MCNC: 0.5 MG/DL (ref 0.2–1.3)
EOSINOPHIL # BLD AUTO: 0.3 10E3/UL (ref 0–0.7)
EOSINOPHIL NFR BLD AUTO: 3 %
ERYTHROCYTE [DISTWIDTH] IN BLOOD BY AUTOMATED COUNT: 13.2 % (ref 10–15)
HCT VFR BLD AUTO: 28 % (ref 40–53)
HGB BLD-MCNC: 8.9 G/DL (ref 13.3–17.7)
IMM GRANULOCYTES # BLD: 0.1 10E3/UL
IMM GRANULOCYTES NFR BLD: 1 %
LYMPHOCYTES # BLD AUTO: 1.4 10E3/UL (ref 0.8–5.3)
LYMPHOCYTES NFR BLD AUTO: 15 %
MCH RBC QN AUTO: 28.1 PG (ref 26.5–33)
MCHC RBC AUTO-ENTMCNC: 31.8 G/DL (ref 31.5–36.5)
MCV RBC AUTO: 88 FL (ref 78–100)
MONOCYTES # BLD AUTO: 0.8 10E3/UL (ref 0–1.3)
MONOCYTES NFR BLD AUTO: 9 %
NEUTROPHILS # BLD AUTO: 6.9 10E3/UL (ref 1.6–8.3)
NEUTROPHILS NFR BLD AUTO: 71 %
NRBC # BLD AUTO: 0 10E3/UL
NRBC BLD AUTO-RTO: 0 /100
PLATELET # BLD AUTO: 591 10E3/UL (ref 150–450)
PROT SERPL-MCNC: 6.3 G/DL (ref 6.8–8.8)
RBC # BLD AUTO: 3.17 10E6/UL (ref 4.4–5.9)
VANCOMYCIN SERPL-MCNC: 10.3 MG/L
WBC # BLD AUTO: 9.5 10E3/UL (ref 4–11)

## 2021-11-22 PROCEDURE — 99309 SBSQ NF CARE MODERATE MDM 30: CPT | Performed by: NURSE PRACTITIONER

## 2021-11-22 PROCEDURE — P9603 ONE-WAY ALLOW PRORATED MILES: HCPCS | Performed by: FAMILY MEDICINE

## 2021-11-22 PROCEDURE — 85025 COMPLETE CBC W/AUTO DIFF WBC: CPT | Performed by: FAMILY MEDICINE

## 2021-11-22 PROCEDURE — 80076 HEPATIC FUNCTION PANEL: CPT | Performed by: FAMILY MEDICINE

## 2021-11-22 PROCEDURE — 80202 ASSAY OF VANCOMYCIN: CPT | Performed by: FAMILY MEDICINE

## 2021-11-22 PROCEDURE — 36415 COLL VENOUS BLD VENIPUNCTURE: CPT | Performed by: FAMILY MEDICINE

## 2021-11-22 PROCEDURE — U0003 INFECTIOUS AGENT DETECTION BY NUCLEIC ACID (DNA OR RNA); SEVERE ACUTE RESPIRATORY SYNDROME CORONAVIRUS 2 (SARS-COV-2) (CORONAVIRUS DISEASE [COVID-19]), AMPLIFIED PROBE TECHNIQUE, MAKING USE OF HIGH THROUGHPUT TECHNOLOGIES AS DESCRIBED BY CMS-2020-01-R: HCPCS | Mod: ORL | Performed by: NURSE PRACTITIONER

## 2021-11-22 RX ORDER — GUAIFENESIN 200 MG/10ML
200 LIQUID ORAL EVERY 4 HOURS PRN
COMMUNITY
Start: 2021-11-22 | End: 2021-11-29

## 2021-11-22 ASSESSMENT — MIFFLIN-ST. JEOR: SCORE: 2055.45

## 2021-11-22 NOTE — LETTER
"    11/22/2021        RE: Kartik Swain  [address] [place] [id number]        [facility] SERVICES  [place] Medical Record Number: [id number]  Chief Complaint   Patient presents with     RECHECK     HPI: Kartik Swain is a 61 year old (1960), who is being seen today for an episodic care visit at: [facility]) [[id number]]. Today's concern is: Patient with recent hospitalization for for NSTEMI, but found to have MRSA bacteremia and concern for psoas abscess/fluid collection on lumbar MRI. I&D on 11/7. Continues on IV vancomycin for 4-6 weeks. He is feels his mobility is improving, back pain is controlled. He is concerned as he has been having diarrhea - has been passing clear liquid stools. Aide reports that it looks like mucus. Has not passed a brown stools in about 2 days. He denies any belly pain. Appetite has been ok. He has had low BG at night - was 66 on Friday night, was symptomatic with shakes and sweating Has been refusing his evening glargine. His catheter was removed last week, and he he has been voiding without issues. He is also requesting for cough medicine that he can take during the day- does get some relief with Nyquil at night, but feels he needs something for breakthrough coughing.  .     Allergies and PMH/PSH reviewed in [facility] today.    REVIEW OF SYSTEMS:  4 point ROS including Respiratory, CV, GI and , other than that noted in the HPI,  is negative    Objective:   /67   Pulse 84   Temp 98  F (36.7  C)   Resp 18   Ht 1.93 m (6' 4\")   Wt 115.4 kg (254 lb 6.4 oz)   SpO2 96%   BMI 30.97 kg/m    Exam:  GENERAL APPEARANCE:  Alert, in no distress, cooperative  RESP:  lungs clear to auscultation , no respiratory distress  CV:  Palpation and auscultation of heart done , regular rate and rhythm, no murmur, rub, or gallop, no edema  ABDOMEN:  bowel sounds normal, soft, non-tender  M/S:   generalized weakness  SKIN:  wound appearance: RLQ incision " CDI  PSYCH:  oriented X 3, affect and mood normal    Labs:   Recent labs in [facility] reviewed by me today.  Labs copied from chart marked for merge.     Assessment/Plan:  Psoas abscess (H)  MRSA infection  Noted on MRI with SI joint involvement, + Blood cultures with MRSA on 11/4 and 11/5-6; negative on 11/7 and 11/9. S/p I&D on 11/7, Hemovac out on 11/8, continues to have some drainage from surgical drain site.  Labs done today were stable.   - continue current wound care, change PRN for drainage  - IV vanco x 4- 6 weeks - Infusion pharmacy to dose  - continue current PICC line cares.   - Weekly Vanco level, CBC, creatinine, GFR, CRP and LFT's, faxed to ID.   - follow-up with ID on 12/9  - follow-up with ortho on 12/2    Uncontrolled type 2 diabetes mellitus with hyperglycemia (H)  Most recent A1C was 10.9%, recent hospitalization for DKA. Glargine decrease from 40 units to 35 units BID. Having hypoglycemia while in TCU - BG 66 overnight. Has been refusing HS glargine over the weekend. -216 over the weekend  - discontinue HS glargine, continue 35 units q AM,   - lispo 10 units TID with meals, metformin 1000mg BID.   - snack with protein qhs    Essential hypertension  Acute on chronic diastolic congestive heart failure (H)  Fluid overload while IP, aggressively diureised. EF 40%, Angiogram on 11/9, s/p 2 stents to proximal and mid-LAD. Angio site healing well. -120, weight 258-254lbs.  - continue ASA x 1 week, continue Plavix 75mg daily, lasix 40mg daily, atorvastatin 40mg at bedtime, metoprolol XL 25mg daily, losartan 40mg daily  - daily weights  - follow-up with cardiology in 4 weeks, continue to monitor and adjust    Atrial fibrillation with RVR (H)  Presented on 11/5 with -150. Cardiology consulted. HR in TCU 70-90.  Discussed with patient, nursing staff.   - new on apixaban 5mg BID  - amiodarone 400mg BID through 11/17 then 200mg daily  - metoprolol XL 25mg daily.   - follow-up with cardiology in  "4 weeks    Diarrhea, unspecified type  Recent history of constipation, now passing clear liquid stools - ? Mucus. Discussed multiple possible causes with patient. ? If overflow diarrhea. Is on abx, risk for c.diff. If constipation he is requesting milk of mag as it \"works best.\"  - abdominal x-ray today to assess for stools burden  - continue miralax daily PRN, senna-s 2 tab BID PRN  - add milk of mag PRN,  - send stool to c.diff    Cough  Chronic, some breakthrough symptoms  - family supplied Nyquil at bedtime  - will order robitussin q4 PRN for breakthrough symptoms.       Orders:  1. Abdominal xray today  2. Send stool for c.diff  3. Give snack with protein at HS  4. Diabetic tussin 20ml q4h PRN  5. Milk of mag PRN for constipation  6. discontinue hs glargine, continue 35 units q AM     Electronically signed by: LO Chicas CNP        Sincerely,        LO Chicas CNP    "

## 2021-11-23 ENCOUNTER — LAB REQUISITION (OUTPATIENT)
Dept: LAB | Facility: CLINIC | Age: 61
End: 2021-11-23
Payer: COMMERCIAL

## 2021-11-23 PROBLEM — I48.91 ATRIAL FIBRILLATION (H): Status: ACTIVE | Noted: 2021-11-01

## 2021-11-23 PROBLEM — E78.5 DYSLIPIDEMIA: Status: ACTIVE | Noted: 2021-11-23

## 2021-11-23 LAB
HOLD SPECIMEN: NORMAL
SARS-COV-2 RNA RESP QL NAA+PROBE: NEGATIVE

## 2021-11-23 NOTE — PROGRESS NOTES
"[facility] SERVICES  [place] Medical Record Number: [id number]  Chief Complaint   Patient presents with     RECHECK     HPI: Kartik Swain is a 61 year old (1960), who is being seen today for an episodic care visit at: [facility]) [[id number]]. Today's concern is: Patient with recent hospitalization for for NSTEMI, but found to have MRSA bacteremia and concern for psoas abscess/fluid collection on lumbar MRI. I&D on 11/7. Continues on IV vancomycin for 4-6 weeks. Seen today to follow-up on BG, cough, and bowel movements. He had not had a BM in 2-3 days and was only passing clear mucus rectally, abdominal xray ordered, however patient had large formed BM that evening. Requested xray be cancelled. Repots he has been having multiple formed BM's daily since then and feels like he is \"finally emptying out.\" He also reports his cough is better, thinks it is due to dry throat. Voice has been scratchy since since surgery. His appetite is better. He is happy with the progress he has made with with therapy. Is hoping he will be ashley to go home soon. Report pain in back  Worst at night as he can't get comfortable in bed, has been using oxycodone with relief. Is also asking when he can get his sutures removed.     Allergies and PMH/PSH reviewed in [facility] today.    REVIEW OF SYSTEMS:  4 point ROS including Respiratory, CV, GI and , other than that noted in the HPI,  is negative    Objective:   /66   Pulse 81   Temp 98.5  F (36.9  C)   Resp 18   Ht 1.93 m (6' 4\")   Wt 118.5 kg (261 lb 3.2 oz)   SpO2 95%   BMI 31.79 kg/m    Exam:  GENERAL APPEARANCE:  Alert, in no distress  RESP:  lungs clear to auscultation , no respiratory distress  CV:  regular rate and rhythm, no murmur, rub, or gallop, peripheral edema 1+ in BLE  ABDOMEN:  bowel sounds normal  M/S:   no gross joint doformities  SKIN:  RLQ abdominal incision CDI, sutures present  NEURO:   hoarse voice CN 2-12 otherwise grossly " intact  PSYCH:  oriented X 3, normal insight, judgement and memory, affect and mood normal    Labs:   Recent labs in [facility] reviewed by me today.     Assessment/Plan:  Uncontrolled type 2 diabetes mellitus with hyperglycemia (H)  Most recent A1C was 10.9%, recent hospitalization for DKA. Glargine decrease from 40 units to 35 units BID. Having hypoglycemia while in TCU, so HS glargine stopped on 11/22 BG now typically running , one sugar elevated at 303.   - continue glargine 35 units q AM  - lispo 10 units TID with meals, metformin 1000mg BID.   - snack with protein at bedtime    Slow transit constipation  Now having regular BM's.   - discontinue order for c.diff  - continue miralax daily PRN, senna-s 2 tab BID PRN, milk of mag PRN,  - monitor and adjust     Cough  Improved. Discussed voice likely related to recent intubation/procedures while IP  - encourage fluids  - Nyquil at bedtime  - robitussin PRN  - If voice does not improve could consider follow-up with ENT    Psoas abscess (H)  MRSA infection  Noted on MRI with SI joint involvement, + Blood cultures with MRSA on 11/4 and 11/5-6; negative on 11/7 and 11/9. S/p I&D on 11/7, Hemovac out on 11/8. No further drainage noted, incision healing well.   - continue current wound care, change PRN for drainage  - IV vanco x 4- 6 weeks - Infusion pharmacy to dose  - continue current PICC line cares.   - Weekly Vanco level, CBC, creatinine, GFR, CRP and LFT's, faxed to ID. Labs due today, as of time of visit facility still awaiting for lab to show up to draw, they are holding vanco until after draw to get trough.   - follow-up with ID on 12/9  - follow-up with ortho on 12/2, per hospital notes will remove sutures at that time.       Orders:  NNO    Electronically signed by: LO Chicas CNP

## 2021-11-24 ENCOUNTER — TRANSITIONAL CARE UNIT VISIT (OUTPATIENT)
Dept: GERIATRICS | Facility: CLINIC | Age: 61
End: 2021-11-24
Payer: COMMERCIAL

## 2021-11-24 VITALS
OXYGEN SATURATION: 95 % | BODY MASS INDEX: 31.81 KG/M2 | RESPIRATION RATE: 18 BRPM | TEMPERATURE: 98.5 F | HEART RATE: 81 BPM | DIASTOLIC BLOOD PRESSURE: 66 MMHG | WEIGHT: 261.2 LBS | SYSTOLIC BLOOD PRESSURE: 117 MMHG | HEIGHT: 76 IN

## 2021-11-24 DIAGNOSIS — K59.01 SLOW TRANSIT CONSTIPATION: ICD-10-CM

## 2021-11-24 DIAGNOSIS — K68.12 PSOAS ABSCESS (H): ICD-10-CM

## 2021-11-24 DIAGNOSIS — R05.9 COUGH: ICD-10-CM

## 2021-11-24 DIAGNOSIS — E11.65 UNCONTROLLED TYPE 2 DIABETES MELLITUS WITH HYPERGLYCEMIA (H): Primary | ICD-10-CM

## 2021-11-24 DIAGNOSIS — A49.02 MRSA INFECTION: ICD-10-CM

## 2021-11-24 PROCEDURE — 99309 SBSQ NF CARE MODERATE MDM 30: CPT | Performed by: NURSE PRACTITIONER

## 2021-11-24 ASSESSMENT — MIFFLIN-ST. JEOR: SCORE: 2086.3

## 2021-11-24 NOTE — LETTER
"    11/24/2021        RE: Kartik Swain  [address] [facility] [address]        M [facility] SERVICES  [place] [facility] Record Number: [id number]  Chief Complaint   Patient presents with     RECHECK     HPI: Kartik Swain is a 61 year old (1960), who is being seen today for an episodic care visit at: [facility] ([facility]) [[id number]]. Today's concern is: Patient with recent hospitalization for for NSTEMI, but found to have MRSA bacteremia and concern for psoas abscess/fluid collection on lumbar MRI. I&D on 11/7. Continues on IV vancomycin for 4-6 weeks. Seen today to follow-up on BG, cough, and bowel movements. He had not had a BM in 2-3 days and was only passing clear mucus rectally, abdominal xray ordered, however patient had large formed BM that evening. Requested xray be cancelled. Repots he has been having multiple formed BM's daily since then and feels like he is \"finally emptying out.\" He also reports his cough is better, thinks it is due to dry throat. Voice has been scratchy since since surgery. His appetite is better. He is happy with the progress he has made with with therapy. Is hoping he will be ashley to go home soon. Report pain in back  Worst at night as he can't get comfortable in bed, has been using oxycodone with relief. Is also asking when he can get his sutures removed.     Allergies and PMH/PSH reviewed in [facility] today.    REVIEW OF SYSTEMS:  4 point ROS including Respiratory, CV, GI and , other than that noted in the HPI,  is negative    Objective:   /66   Pulse 81   Temp 98.5  F (36.9  C)   Resp 18   Ht 1.93 m (6' 4\")   Wt 118.5 kg (261 lb 3.2 oz)   SpO2 95%   BMI 31.79 kg/m    Exam:  GENERAL APPEARANCE:  Alert, in no distress  RESP:  lungs clear to auscultation , no respiratory distress  CV:  regular rate and rhythm, no murmur, rub, or gallop, peripheral edema 1+ in BLE  ABDOMEN:  bowel sounds normal  M/S:   no gross joint doformities  SKIN:  RLQ " abdominal incision CDI, sutures present  NEURO:   hoarse voice CN 2-12 otherwise grossly intact  PSYCH:  oriented X 3, normal insight, judgement and memory, affect and mood normal    Labs:   Recent labs in [facility] reviewed by me today.     Assessment/Plan:  Uncontrolled type 2 diabetes mellitus with hyperglycemia (H)  Most recent A1C was 10.9%, recent hospitalization for DKA. Glargine decrease from 40 units to 35 units BID. Having hypoglycemia while in TCU, so HS glargine stopped on 11/22 BG now typically running , one sugar elevated at 303.   - continue glargine 35 units q AM  - lispo 10 units TID with meals, metformin 1000mg BID.   - snack with protein at bedtime    Slow transit constipation  Now having regular BM's.   - discontinue order for c.diff  - continue miralax daily PRN, senna-s 2 tab BID PRN, milk of mag PRN,  - monitor and adjust     Cough  Improved. Discussed voice likely related to recent intubation/procedures while IP  - encourage fluids  - Nyquil at bedtime  - robitussin PRN  - If voice does not improve could consider follow-up with ENT    Psoas abscess (H)  MRSA infection  Noted on MRI with SI joint involvement, + Blood cultures with MRSA on 11/4 and 11/5-6; negative on 11/7 and 11/9. S/p I&D on 11/7, Hemovac out on 11/8. No further drainage noted, incision healing well.   - continue current wound care, change PRN for drainage  - IV vanco x 4- 6 weeks - Infusion pharmacy to dose  - continue current PICC line cares.   - Weekly Vanco level, CBC, creatinine, GFR, CRP and LFT's, faxed to ID. Labs due today, as of time of visit facility still awaiting for lab to show up to draw, they are holding vanco until after draw to get trough.   - follow-up with ID on 12/9  - follow-up with ortho on 12/2, per hospital notes will remove sutures at that time.       Orders:  NNO    Electronically signed by: LO Chicas CNP        Sincerely,        LO Chicas CNP

## 2021-11-26 ENCOUNTER — TELEPHONE (OUTPATIENT)
Dept: GERIATRICS | Facility: CLINIC | Age: 61
End: 2021-11-26
Payer: COMMERCIAL

## 2021-11-26 DIAGNOSIS — G89.29 CHRONIC BILATERAL LOW BACK PAIN WITH LEFT-SIDED SCIATICA: Primary | ICD-10-CM

## 2021-11-26 DIAGNOSIS — M54.42 CHRONIC BILATERAL LOW BACK PAIN WITH LEFT-SIDED SCIATICA: Primary | ICD-10-CM

## 2021-11-26 RX ORDER — OXYCODONE HYDROCHLORIDE 5 MG/1
5-10 TABLET ORAL EVERY 6 HOURS PRN
Qty: 10 TABLET | Refills: 0 | Status: SHIPPED | OUTPATIENT
Start: 2021-11-26 | End: 2021-11-29

## 2021-11-26 NOTE — TELEPHONE ENCOUNTER
[facility] GERIATRIC SERVICES TELEPHONE ENCOUNTER    No chief complaint on file.      Kartik Swain is a 61 year old  (1960),Nurse called today to report: only 3 oxycodone tabs remaining.  Is averaging 1/day.  Request refill before weekend    ASSESSMENT/PLAN  Pain related to infection    Will order 10 tabs.  NP to follow next week  Electronically signed by:   LO Griffiths CNP    \

## 2021-11-26 NOTE — PROGRESS NOTES
[facility] GERIATRIC SERVICES DISCHARGE SUMMARY  Patient Name: Kartik Swain  YOB: 1960  [place] [facility] Record Number: [id number]  Place of Service Where Encounter Took Place: [facility] ([facility]) [[id number]]    PRIMARY CARE PROVIDER AND CLINIC RESPONSIBLE AFTER TRANSFER: GULSHAN FLAHERTY [facility] [address] / [facility] [address]; Phone: [phone]; Fax: [phone]; Non-[facility] Provider     Transferring providers: LO Aragon CNP; Ian Miller MD  Recent Hospitalization/ED: Hospital  [facility]  stay 11/4/21 to 11/13/21.  Date of SNF Admission: 11/13/21  Date of SNF (anticipated) Discharge: 12/1/2021  Discharged to: previous independent home  Cognitive Scores: BIMS: 15/15  Physical Function: Ambulating 150+ ft with walker  DME: DME F2F done walker    CODE STATUS/ADVANCE DIRECTIVES DISCUSSION: Full Code - CPR/Full Code  ALLERGIES: Patient has no known allergies.    NURSING FACILITY COURSE   Medication Changes/Rationale:     Added Nyquil, cough syrup for cough    Titrated laxatives per bowel movements    Decrease glargine due to hypoglycemia    Started on pantoprazole 20mg daily as on plavix and Eliquis    Summary of nursing facility stay:   Kartik Swain is a 62 yo male, PMH DM2, CORAL, HTN, and chronic pain, who was initially admitted for NSTEMI, but found to have MRSA bacteremia and concern for psoas abscess/fluid collection on lumbar MRI. I&D on 11/7, hemovac placed on 11/7. Removed on 11/8.. PICC Line placed on 11/11. Per ID will need 4-6 weeks of IV abx. Echo with EF of 40%, was diuresed with IV lasix. Developed with Afib RVR, cardiology consulted. Angiogram on 11/9. 2 stents placed. Started on DAPT x 1 week + apixaban. Developed MASON, losartan put on hold. Developed urinary retention, felt due to BPH. Lyrica held briefly due to MASON. Worsening back pain, per neurosurgery hardware appropriately seated, briefly on tramadol and oxycodone, Lyrica and  flexeril resumed. Endocrine followed for BG management as recent hospitalization for mild DKA. When medically stable was discharged to TCU for further rehab and medical management.     Psoas abscess (H)  MRSA infection  Noted on MRI with SI joint involvement, + Blood cultures with MRSA on 11/4 and 11/5-6; negative on 11/7 and 11/9. S/p I&D on 11/7, Hemovac out on 11/8. Initially had large amount of drainage from incision in TCU, now resolved.  Labs done today were stable.   - continue current wound care, change PRN for drainage  - IV vanco x 4- 6 weeks - Infusion pharmacy to dose  - continue current PICC line cares.   - Weekly Vanco level, CBC, creatinine, GFR, CRP and LFT's, faxed to ID.   - follow-up with ID on 12/9  - follow-up with ortho on 12/2 - sutures to be removed at that visit.   - Home infusion referral    Chronic bilateral low back pain with left-sided sciatica  Physical deconditioning  Mobility improved with PT and OT, recommended walker at discharge. Will not need home care services.   - Walker Order    Uncontrolled type 2 diabetes mellitus with hyperglycemia (H)  Most recent A1C was 10.9%, recent hospitalization for DKA. Glargine decrease from 40 units to 35 units BID. Having hypoglycemia while in TCU, so HS glargine stopped on 11/22. BG no typically 100-200.   - continue glargine 35 units q AM in TCU - will transition to Levemir 35 units q AM on discharge as patient reports he has better coverage for this medication and would like to resume it  - continue lispo 10 units TID with meals, metformin 1000mg BID.   - snack with protein at bedtime    Slow transit constipation  Went several days without a BM, was given supp at had multiple. On 11/21, started passing clear liquid stool, some concern for blockage. X-ray ordered, however he then passed multiple, large formed stools. Now having regular bowel movements. Not on any scheduled laxatives.     Cough  Chronic  - use PRN Robitussin and Nyquil in TCU.      Acute on chronic diastolic congestive heart failure (H)  Essential hypertension  Acute on chronic diastolic congestive heart failure (H)  Coronary artery disease involving native coronary artery of native heart without angina pectoris  Fluid overload while IP, aggressively diureised. EF 40%, Angiogram on 11/9, s/p 2 stents to proximal and mid-LAD. Angio site healing well. -120, weight 258-254lbs.  - completed ASA x 1 week on 11/21,  -  continue Plavix 75mg daily, lasix 40mg daily, atorvastatin 40mg at bedtime, metoprolol XL 25mg daily, losartan 40mg daily  - daily weights  - follow-up with cardiology in 4 weeks, continue to monitor and adjust     Atrial fibrillation with RVR (H)  Presented on 11/5 with -150. Cardiology consulted. HR in TCU 70-90.    - new on apixaban 5mg BID  - amiodarone 400mg BID through 11/17 then 200mg daily  - metoprolol XL 25mg daily.   - follow-up with cardiology in 4 weeks    Gastroesophageal reflux disease with esophagitis without hemorrhage  Started on pantoprazole to 20mg daily due to risk of UGIB given Eliquis and Plavix    Obstructive uropathy  Benign prostatic hyperplasia with urinary retention  New eli catheter while IP due to urinary retention, new on Flomax. Catheter removed in TCU on 11/18 without issue.   - continue flomax 0.4mg daily.       Chronic bilateral low back pain, unspecified whether sciatica present  Chronic, currently having acute post op pain, but improved in TCU.   - schedule APAP 650mg TID in TCU - discontinued on discharge per patient request.   - oxycodone 5mg q6h PRN on ddischarge , Lyrica 100mg TID         Discharge Medications:  Current Outpatient Medications   Medication Sig Dispense Refill     insulin detemir (LEVEMIR PEN) 100 UNIT/ML pen Inject 35 Units Subcutaneous every morning 15 mL      metFORMIN (GLUCOPHAGE) 1000 MG tablet Take 1 tablet (1,000 mg) by mouth 2 times daily (with meals)       oxyCODONE (ROXICODONE) 5 MG tablet Take 1  tablet (5 mg) by mouth every 6 hours as needed for pain 10 tablet 0     alteplase 2 mg/2 mL (in 10 mL syringe) Inject 2 mg into the vein daily as needed As needed for occluded IV - instill 2 mg and after 20 minutes may repeat 2 mg dose       amiodarone (PACERONE) 200 MG tablet Take 1 tablet (200 mg) by mouth daily       apixaban ANTICOAGULANT (ELIQUIS) 5 MG tablet Take 5 mg by mouth 2 times daily       atorvastatin (LIPITOR) 40 MG tablet Take 40 mg by mouth At Bedtime        clopidogrel (PLAVIX) 75 MG tablet Take 75 mg by mouth every morning       furosemide (LASIX) 40 MG tablet Take 40 mg by mouth every morning       GLUCOPHAGE 1000 MG OR TABS 1 TABLET TWICE DAILY 60 2     insulin lispro (HUMALOG KWIKPEN) 100 UNIT/ML (1 unit dial) KWIKPEN Inject 10 Units Subcutaneous 3 times daily (before meals)       LOSARTAN POTASSIUM PO Take 25 mg by mouth daily (Patient not taking: Reported on 11/15/2021)       metoprolol succinate ER (TOPROL-XL) 25 MG 24 hr tablet Take 25 mg by mouth daily       MULTIPLE VITAMINS-MINERALS PO Take 1 tablet by mouth every morning       nitroGLYcerin (NITROSTAT) 0.4 MG sublingual tablet Place 0.4 mg under the tongue every 5 minutes as needed for chest pain For chest pain place 1 tablet under the tongue every 5 minutes for 3 doses. If symptoms persist 5 minutes after 1st dose call 911.       ONE TOUCH ULTRA TEST VI STRP QID AND  PRN 1YR     pantoprazole (PROTONIX) 20 MG EC tablet Take 1 tablet (20 mg) by mouth daily       pregabalin (LYRICA) 100 MG capsule Take 100 mg by mouth 3 times daily        sodium chloride 0.9% infusion Inject 10 mLs into the vein See Admin Instructions IV flush 10 mL as needed - pre and post IV abx and after blood draws       tamsulosin (FLOMAX) 0.4 MG capsule Take 0.4 mg by mouth At Bedtime       VANCOMYCIN HCL IV Inject 1,750 mg into the vein every morning       Controlled medications:   Medication 10 oxycodone electronically prescribed to [facility]  "pharmacy  Medication: oxycodone , 7 tabs given to patient at the time of discharge to take home   Lyrica 8 tablets sent with patient   Past Medical History:   Past Medical History:   Diagnosis Date     Depressive disorder, not elsewhere classified      Other specified idiopathic peripheral neuropathy     Peripheral Neuropathy      Type II or unspecified type diabetes mellitus without mention of complication, not stated as uncontrolled     DM     Physical Exam:   Vitals: /66   Pulse 89   Temp 98.3  F (36.8  C)   Resp 18   Ht 1.93 m (6' 4\")   Wt 117.5 kg (259 lb)   SpO2 97%   BMI 31.53 kg/m    BMI= Body mass index is 31.53 kg/m .  GENERAL APPEARANCE:  Alert, in no distress, cooperative  RESP:  lungs clear to auscultation , no respiratory distress  CV:  regular rate and rhythm, no murmur, rub, or gallop, peripheral edema 2+ in BLE  ABDOMEN:  bowel sounds normal  M/S:   decreased ROM to lumbar spine  SKIN:  wound appearance: RLQ incision CDI< sutures in place  NEURO:   Cn 2-12 grossly intact, hoase voice  PSYCH:  oriented X 3, normal insight, judgement and memory, affect and mood normal     SNF labs: Recent labs in [facility] reviewed by me today.  and   Most Recent 3 CBC's:  Recent Labs   Lab Test 11/29/21  0910 12/15/17  0920   WBC 9.0 7.9   HGB 9.7* 14.9   MCV 89 85   * 207     Most Recent 3 BMP's:  Recent Labs   Lab Test 12/15/17  0920      POTASSIUM 4.3   CHLORIDE 101   CO2 22   BUN 16   CR 0.79   ANIONGAP 10   JENARO 8.3*   *     Most Recent 2 LFT's:  Recent Labs   Lab Test 11/29/21  0910   AST 29   ALT 34   ALKPHOS 124   BILITOTAL 0.6       DISCHARGE PLAN:    Follow up labs: Weekly Vanco level, CBC, creatinine, GFR, CRP and LFT's, faxed to ID; to be drawn by home infusion    Medical Follow Up:     Follow up with primary care provider in 1-2 weeks    Follow up with specialist cardiology, ortho, and ID and urology as recommended   Current [place] scheduled appointments: NA     Discharge " Services: Home Care: [facility].    Discharge Instructions Verbalized to Patient at Discharge:     Monitor blood glucose monitoring 4 times a day. Keep a record and bring it to your next primary provider visit.     Weigh yourself daily in the morning and keep a record. Call your primary clinic: a) if you are more short of breath, or b) if your weight changes more than 3 pounds in one day or more than 5 pounds in one week.     OK to shower but no bathing or soaking until approved by surgeon.     TOTAL DISCHARGE TIME: Greater than 30 minutes  Electronically signed by:  LO Chicas CNP     Documentation of Face to Face and Certification for Home Health Services    I certify that services are/were furnished while this patient was under the care of a physician and that a physician or an allowed non-physician practitioner (NPP), had a face-to-face encounter that meets the physician face-to-face encounter requirements. The encounter was in whole, or in part, related to the primary reason for home health. The patient is confined to his/her home and needs intermittent skilled nursing, physical therapy, speech-language pathology, or the continued need for occupational therapy. A plan of care has been established by a physician and is periodically reviewed by a physician.  Date of Face-to-Face Encounter: 11/29/2021.    I certify that, based on my findings, the following services are medically necessary home health services: Nursing, home infusion due to ongoing IV antibiotics.    My clinical findings support the need for the above skilled services because: Requires assistance of another person or specialized equipment to access medical services because patient: Range of motion limitations prevents ability to exit home safely...    Patient to re-establish plan of care with their PCP within 7-10 days after leaving the facility to reestablish care.  Medicare certified PEC[facility] provider: LO Chicas  CNP  Date: December 1, 2021

## 2021-11-29 ENCOUNTER — DISCHARGE SUMMARY NURSING HOME (OUTPATIENT)
Dept: GERIATRICS | Facility: CLINIC | Age: 61
End: 2021-11-29
Payer: COMMERCIAL

## 2021-11-29 ENCOUNTER — LAB REQUISITION (OUTPATIENT)
Dept: LAB | Facility: CLINIC | Age: 61
End: 2021-11-29

## 2021-11-29 ENCOUNTER — LAB REQUISITION (OUTPATIENT)
Dept: LAB | Facility: CLINIC | Age: 61
End: 2021-11-29
Payer: MEDICARE

## 2021-11-29 VITALS
WEIGHT: 259 LBS | HEIGHT: 76 IN | BODY MASS INDEX: 31.54 KG/M2 | SYSTOLIC BLOOD PRESSURE: 104 MMHG | OXYGEN SATURATION: 97 % | TEMPERATURE: 98.3 F | DIASTOLIC BLOOD PRESSURE: 66 MMHG | HEART RATE: 89 BPM | RESPIRATION RATE: 18 BRPM

## 2021-11-29 DIAGNOSIS — E11.65 UNCONTROLLED TYPE 2 DIABETES MELLITUS WITH HYPERGLYCEMIA (H): ICD-10-CM

## 2021-11-29 DIAGNOSIS — R05.9 COUGH: ICD-10-CM

## 2021-11-29 DIAGNOSIS — R53.81 PHYSICAL DECONDITIONING: ICD-10-CM

## 2021-11-29 DIAGNOSIS — I50.33 ACUTE ON CHRONIC DIASTOLIC CONGESTIVE HEART FAILURE (H): ICD-10-CM

## 2021-11-29 DIAGNOSIS — N13.9 OBSTRUCTIVE UROPATHY: ICD-10-CM

## 2021-11-29 DIAGNOSIS — N40.1 BENIGN PROSTATIC HYPERPLASIA WITH URINARY RETENTION: ICD-10-CM

## 2021-11-29 DIAGNOSIS — I48.91 ATRIAL FIBRILLATION WITH RVR (H): ICD-10-CM

## 2021-11-29 DIAGNOSIS — I10 ESSENTIAL HYPERTENSION: ICD-10-CM

## 2021-11-29 DIAGNOSIS — K59.01 SLOW TRANSIT CONSTIPATION: ICD-10-CM

## 2021-11-29 DIAGNOSIS — G89.29 CHRONIC BILATERAL LOW BACK PAIN WITH LEFT-SIDED SCIATICA: ICD-10-CM

## 2021-11-29 DIAGNOSIS — I25.10 CORONARY ARTERY DISEASE INVOLVING NATIVE CORONARY ARTERY OF NATIVE HEART WITHOUT ANGINA PECTORIS: ICD-10-CM

## 2021-11-29 DIAGNOSIS — K21.00 GASTROESOPHAGEAL REFLUX DISEASE WITH ESOPHAGITIS WITHOUT HEMORRHAGE: ICD-10-CM

## 2021-11-29 DIAGNOSIS — A49.02 MRSA INFECTION: ICD-10-CM

## 2021-11-29 DIAGNOSIS — K68.12 PSOAS ABSCESS (H): Primary | ICD-10-CM

## 2021-11-29 DIAGNOSIS — R33.8 BENIGN PROSTATIC HYPERPLASIA WITH URINARY RETENTION: ICD-10-CM

## 2021-11-29 DIAGNOSIS — M54.42 CHRONIC BILATERAL LOW BACK PAIN WITH LEFT-SIDED SCIATICA: ICD-10-CM

## 2021-11-29 LAB
ALBUMIN SERPL-MCNC: 1.7 G/DL (ref 3.4–5)
ALP SERPL-CCNC: 124 U/L (ref 40–150)
ALT SERPL W P-5'-P-CCNC: 34 U/L (ref 0–70)
AST SERPL W P-5'-P-CCNC: 29 U/L (ref 0–45)
BASOPHILS # BLD AUTO: 0.1 10E3/UL (ref 0–0.2)
BASOPHILS NFR BLD AUTO: 1 %
BILIRUB DIRECT SERPL-MCNC: 0.2 MG/DL (ref 0–0.2)
BILIRUB SERPL-MCNC: 0.6 MG/DL (ref 0.2–1.3)
EOSINOPHIL # BLD AUTO: 0.2 10E3/UL (ref 0–0.7)
EOSINOPHIL NFR BLD AUTO: 2 %
ERYTHROCYTE [DISTWIDTH] IN BLOOD BY AUTOMATED COUNT: 13.7 % (ref 10–15)
HCT VFR BLD AUTO: 30.9 % (ref 40–53)
HGB BLD-MCNC: 9.7 G/DL (ref 13.3–17.7)
IMM GRANULOCYTES # BLD: 0.1 10E3/UL
IMM GRANULOCYTES NFR BLD: 1 %
LYMPHOCYTES # BLD AUTO: 1.5 10E3/UL (ref 0.8–5.3)
LYMPHOCYTES NFR BLD AUTO: 16 %
MCH RBC QN AUTO: 27.9 PG (ref 26.5–33)
MCHC RBC AUTO-ENTMCNC: 31.4 G/DL (ref 31.5–36.5)
MCV RBC AUTO: 89 FL (ref 78–100)
MONOCYTES # BLD AUTO: 1 10E3/UL (ref 0–1.3)
MONOCYTES NFR BLD AUTO: 11 %
NEUTROPHILS # BLD AUTO: 6.2 10E3/UL (ref 1.6–8.3)
NEUTROPHILS NFR BLD AUTO: 69 %
NRBC # BLD AUTO: 0 10E3/UL
NRBC BLD AUTO-RTO: 0 /100
PLATELET # BLD AUTO: 572 10E3/UL (ref 150–450)
PROT SERPL-MCNC: 6.6 G/DL (ref 6.8–8.8)
RBC # BLD AUTO: 3.48 10E6/UL (ref 4.4–5.9)
VANCOMYCIN SERPL-MCNC: 10.2 MG/L
WBC # BLD AUTO: 9 10E3/UL (ref 4–11)

## 2021-11-29 PROCEDURE — 80076 HEPATIC FUNCTION PANEL: CPT | Performed by: NURSE PRACTITIONER

## 2021-11-29 PROCEDURE — P9603 ONE-WAY ALLOW PRORATED MILES: HCPCS | Performed by: NURSE PRACTITIONER

## 2021-11-29 PROCEDURE — 99316 NF DSCHRG MGMT 30 MIN+: CPT | Performed by: NURSE PRACTITIONER

## 2021-11-29 PROCEDURE — 85025 COMPLETE CBC W/AUTO DIFF WBC: CPT | Performed by: NURSE PRACTITIONER

## 2021-11-29 PROCEDURE — 36415 COLL VENOUS BLD VENIPUNCTURE: CPT | Performed by: NURSE PRACTITIONER

## 2021-11-29 PROCEDURE — 80202 ASSAY OF VANCOMYCIN: CPT | Performed by: NURSE PRACTITIONER

## 2021-11-29 RX ORDER — OXYCODONE HYDROCHLORIDE 5 MG/1
5 TABLET ORAL EVERY 6 HOURS PRN
Qty: 10 TABLET | Refills: 0 | COMMUNITY
Start: 2021-11-29 | End: 2021-12-01

## 2021-11-29 ASSESSMENT — MIFFLIN-ST. JEOR: SCORE: 2076.32

## 2021-11-29 NOTE — PROGRESS NOTES
"DME (Durable Medical Equipment) Orders and Documentation  Orders Placed This Encounter   Procedures     Walker Order      The patient was assessed and it was determined the patient is in need of the following listed DME Supplies/Equipment. Please complete supporting documentation below to demonstrate medical necessity.      DME All Other Item(s) Documentation    List reason for need and supporting documentation for medical necessity below for each DME item.     1. Due to unsteady gait in the setting of deconditioning, chronic low back pain Kartik Swain wound benefit from lifetime use of walker to help stablize gait and reduce falls.         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   Primary:    MEDICA [id number]      Order Date:  2021          Durable Medical Equipment Order:      Walker Order [[id number]]    Electronically signed by: Yamileth Gomez APRN CNP on 21 1615   Ordering user: Yamileth Gomez APRN CNP 21 161   Diagnoses   Chronic bilateral low back pain with left-sided sciatica [M54.42, G89.29]   Physical deconditioning [R53.81]     Questionnaire    Question Answer   DME Provider: [facility]   Walker Type: Standard (2 Wheel)   Order comments: I, the undersigned, certify that the above prescribed supplies are medically necessary for this patient and is both reasonable and necessary in reference to accepted standards of medical and necessary in reference to accepted standards of medical practice in the treatment of this patient's condition and is not prescribed as a convenience.      Height: 6' 4\"  Weight: 259 lbs 0 oz     Authorizing provider " infomation:  Yamileth Gomez APRN CNP   (NPI:[id number])         [facility] Medical Equipment Locations:   Business Hours:  Monday - Friday 8am - 4:30pm  Visit [email or web address]     [facility] [place]  [address], [address]  [place] [address]  [phone]  Monday - Friday, 8 a.m. to 4:30 p.m.     [facility]  [address], [address]  [place] [address]  [phone]  Monday - Friday, 8 a.m. to 4:30 p.m.        [facility]  [address], [address]  [place] [address]  [phone]  Monday - Friday, 8 a.m. to 4:30 p.m.     [facility]  [address], [address]  [place] [address]  [phone]  Monday - Friday, 8 a.m. to 4:30 p.m.     [facility]  [address], [address]  [place] [address]  [phone]  Monday - Friday, 8 a.m. to 4:30 p.m.     [facility]  [address], [address]  [place] [address]  [phone]  Monday - Friday, 8 a.m. to 4:30 p.m.  *Closed daily Noon to 1 p.m. for patient deliveries     [place]  [facility]  [address], [address]     [place] [address]            [phone]  Monday - Friday, 8 a.m. to 5:00 p.m.

## 2021-11-29 NOTE — LETTER
11/29/2021        RE: Kartik Swain  [address] [facility] [address]        [facility] DISCHAR[facility]  Patient Name: Kartik Swain  YOB: 1960  [facility] Medical Record Number: [id number]  Place of Service Where Encounter Took Place: [facility] [place] [facility] (SNF) [[id number]]    PRIMARY CARE PROVIDER AND CLINIC RESPONSIBLE AFTER [facility]SFER[facility] [address] [facility] [address]; Phone: [phone]; Fax: [phone]; Non-[facility] Provider     Transferring providers: LO Aragon CNP; Ian Miller MD  Recent Hospitalization/ED: [facility]  stay 11/4/21 to 11/13/21.  Date of SNF Admission: 11/13/21  Date of SNF (anticipated) Discharge: 12/1/2021  Discharged to: previous independent home  Cognitive Scores: BIMS: 15/15  Physical Function: Ambulating 150+ ft with walker  DME: DME F2F done walker    CODE STATUS/ADVANCE DIRECTIVES DISCUSSION: Full Code - CPR/Full Code  ALLERGIES: Patient has no known allergies.    NURSING FACILITY COURSE   Medication Changes/Rationale:     Added Nyquil, cough syrup for cough    Titrated laxatives per bowel movements    Decrease glargine due to hypoglycemia    Started on pantoprazole 20mg daily as on plavix and Eliquis    Summary of nursing facility stay:   Kartik Swain is a 60 yo male, PMH DM2, CORAL, HTN, and chronic pain, who was initially admitted for NSTEMI, but found to have MRSA bacteremia and concern for psoas abscess/fluid collection on lumbar MRI. I&D on 11/7, hemovac placed on 11/7. Removed on 11/8.. PICC Line placed on 11/11. Per ID will need 4-6 weeks of IV abx. Echo with EF of 40%, was diuresed with IV lasix. Developed with Afib RVR, cardiology consulted. Angiogram on 11/9. 2 stents placed. Started on DAPT x 1 week + apixaban. Developed MASON, losartan put on hold. Developed urinary retention, felt due to BPH. Lyrica held briefly due to MASON. Worsening back pain, per  neurosurgery hardware appropriately seated, briefly on tramadol and oxycodone, Lyrica and flexeril resumed. Endocrine followed for BG management as recent hospitalization for mild DKA. When medically stable was discharged to TCU for further rehab and medical management.     Psoas abscess (H)  MRSA infection  Noted on MRI with SI joint involvement, + Blood cultures with MRSA on 11/4 and 11/5-6; negative on 11/7 and 11/9. S/p I&D on 11/7, Hemovac out on 11/8. Initially had large amount of drainage from incision in TCU, now resolved.  Labs done today were stable.   - continue current wound care, change PRN for drainage  - IV vanco x 4- 6 weeks - Infusion pharmacy to dose  - continue current PICC line cares.   - Weekly Vanco level, CBC, creatinine, GFR, CRP and LFT's, faxed to ID.   - follow-up with ID on 12/9  - follow-up with ortho on 12/2 - sutures to be removed at that visit.   - Home infusion referral    Chronic bilateral low back pain with left-sided sciatica  Physical deconditioning  Mobility improved with PT and OT, recommended walker at discharge. Will not need home care services.   - Walker Order    Uncontrolled type 2 diabetes mellitus with hyperglycemia (H)  Most recent A1C was 10.9%, recent hospitalization for DKA. Glargine decrease from 40 units to 35 units BID. Having hypoglycemia while in TCU, so HS glargine stopped on 11/22. BG no typically 100-200.   - continue glargine 35 units q AM in TCU - will transition to Levemir 35 units q AM on discharge as patient reports he has better coverage for this medication and would like to resume it  - continue lispo 10 units TID with meals, metformin 1000mg BID.   - snack with protein at bedtime    Slow transit constipation  Went several days without a BM, was given supp at had multiple. On 11/21, started passing clear liquid stool, some concern for blockage. X-ray ordered, however he then passed multiple, large formed stools. Now having regular bowel movements. Not  on any scheduled laxatives.     Cough  Chronic  - use PRN Robitussin and Nyquil in TCU.     Acute on chronic diastolic congestive heart failure (H)  Essential hypertension  Acute on chronic diastolic congestive heart failure (H)  Coronary artery disease involving native coronary artery of native heart without angina pectoris  Fluid overload while IP, aggressively diureised. EF 40%, Angiogram on 11/9, s/p 2 stents to proximal and mid-LAD. Angio site healing well. -120, weight 258-254lbs.  - completed ASA x 1 week on 11/21,  -  continue Plavix 75mg daily, lasix 40mg daily, atorvastatin 40mg at bedtime, metoprolol XL 25mg daily, losartan 40mg daily  - daily weights  - follow-up with cardiology in 4 weeks, continue to monitor and adjust     Atrial fibrillation with RVR (H)  Presented on 11/5 with -150. Cardiology consulted. HR in TCU 70-90.    - new on apixaban 5mg BID  - amiodarone 400mg BID through 11/17 then 200mg daily  - metoprolol XL 25mg daily.   - follow-up with cardiology in 4 weeks    Gastroesophageal reflux disease with esophagitis without hemorrhage  Started on pantoprazole to 20mg daily due to risk of UGIB given Eliquis and Plavix    Obstructive uropathy  Benign prostatic hyperplasia with urinary retention  New eli catheter while IP due to urinary retention, new on Flomax. Catheter removed in TCU on 11/18 without issue.   - continue flomax 0.4mg daily.       Chronic bilateral low back pain, unspecified whether sciatica present  Chronic, currently having acute post op pain, but improved in TCU.   - schedule APAP 650mg TID in TCU - discontinued on discharge per patient request.   - oxycodone 5mg q6h PRN on ddischarge , Lyrica 100mg TID         Discharge Medications:  Current Outpatient Medications   Medication Sig Dispense Refill     insulin detemir (LEVEMIR PEN) 100 UNIT/ML pen Inject 35 Units Subcutaneous every morning 15 mL      metFORMIN (GLUCOPHAGE) 1000 MG tablet Take 1 tablet (1,000 mg)  by mouth 2 times daily (with meals)       oxyCODONE (ROXICODONE) 5 MG tablet Take 1 tablet (5 mg) by mouth every 6 hours as needed for pain 10 tablet 0     alteplase 2 mg/2 mL (in 10 mL syringe) Inject 2 mg into the vein daily as needed As needed for occluded IV - instill 2 mg and after 20 minutes may repeat 2 mg dose       amiodarone (PACERONE) 200 MG tablet Take 1 tablet (200 mg) by mouth daily       apixaban ANTICOAGULANT (ELIQUIS) 5 MG tablet Take 5 mg by mouth 2 times daily       atorvastatin (LIPITOR) 40 MG tablet Take 40 mg by mouth At Bedtime        clopidogrel (PLAVIX) 75 MG tablet Take 75 mg by mouth every morning       furosemide (LASIX) 40 MG tablet Take 40 mg by mouth every morning       GLUCOPHAGE 1000 MG OR TABS 1 TABLET TWICE DAILY 60 2     insulin lispro (HUMALOG KWIKPEN) 100 UNIT/ML (1 unit dial) KWIKPEN Inject 10 Units Subcutaneous 3 times daily (before meals)       LOSARTAN POTASSIUM PO Take 25 mg by mouth daily (Patient not taking: Reported on 11/15/2021)       metoprolol succinate ER (TOPROL-XL) 25 MG 24 hr tablet Take 25 mg by mouth daily       MULTIPLE VITAMINS-MINERALS PO Take 1 tablet by mouth every morning       nitroGLYcerin (NITROSTAT) 0.4 MG sublingual tablet Place 0.4 mg under the tongue every 5 minutes as needed for chest pain For chest pain place 1 tablet under the tongue every 5 minutes for 3 doses. If symptoms persist 5 minutes after 1st dose call 911.       ONE TOUCH ULTRA TEST VI STRP QID AND  PRN 1YR     pantoprazole (PROTONIX) 20 MG EC tablet Take 1 tablet (20 mg) by mouth daily       pregabalin (LYRICA) 100 MG capsule Take 100 mg by mouth 3 times daily        sodium chloride 0.9% infusion Inject 10 mLs into the vein See Admin Instructions IV flush 10 mL as needed - pre and post IV abx and after blood draws       tamsulosin (FLOMAX) 0.4 MG capsule Take 0.4 mg by mouth At Bedtime       VANCOMYCIN HCL IV Inject 1,750 mg into the vein every morning       Controlled  "medications:   Medication 10 oxycodone electronically prescribed to [facility] pharmacy  Medication: oxycodone , 7 tabs given to patient at the time of discharge to take home   Lyrica 8 tablets sent with patient   Past Medical History:   Past Medical History:   Diagnosis Date     Depressive disorder, not elsewhere classified      Other specified idiopathic peripheral neuropathy     Peripheral Neuropathy      Type II or unspecified type diabetes mellitus without mention of complication, not stated as uncontrolled     DM     Physical Exam:   Vitals: /66   Pulse 89   Temp 98.3  F (36.8  C)   Resp 18   Ht 1.93 m (6' 4\")   Wt 117.5 kg (259 lb)   SpO2 97%   BMI 31.53 kg/m    BMI= Body mass index is 31.53 kg/m .  GENERAL APPEARANCE:  Alert, in no distress, cooperative  RESP:  lungs clear to auscultation , no respiratory distress  CV:  regular rate and rhythm, no murmur, rub, or gallop, peripheral edema 2+ in BLE  ABDOMEN:  bowel sounds normal  M/S:   decreased ROM to lumbar spine  SKIN:  wound appearance: RLQ incision CDI< sutures in place  NEURO:   Cn 2-12 grossly intact, hoase voice  PSYCH:  oriented X 3, normal insight, judgement and memory, affect and mood normal     SNF labs: Recent labs in [facility] reviewed by me today.  and   Most Recent 3 CBC's:  Recent Labs   Lab Test 11/29/21  0910 12/15/17  0920   WBC 9.0 7.9   HGB 9.7* 14.9   MCV 89 85   * 207     Most Recent 3 BMP's:  Recent Labs   Lab Test 12/15/17  0920      POTASSIUM 4.3   CHLORIDE 101   CO2 22   BUN 16   CR 0.79   ANIONGAP 10   JENARO 8.3*   *     Most Recent 2 LFT's:  Recent Labs   Lab Test 11/29/21  0910   AST 29   ALT 34   ALKPHOS 124   BILITOTAL 0.6       DISCHARGE PLAN:    Follow up labs: Weekly Vanco level, CBC, creatinine, GFR, CRP and LFT's, faxed to ID; to be drawn by home infusion    Medical Follow Up:     Follow up with primary care provider in 1-2 weeks    Follow up with specialist cardiology, ortho, and ID and " urology as recommended   Current [place] scheduled appointments: NA     Discharge Services: Home Care: [facility].    Discharge Instructions Verbalized to Patient at Discharge:     Monitor blood glucose monitoring 4 times a day. Keep a record and bring it to your next primary provider visit.     Weigh yourself daily in the morning and keep a record. Call your primary clinic: a) if you are more short of breath, or b) if your weight changes more than 3 pounds in one day or more than 5 pounds in one week.     OK to shower but no bathing or soaking until approved by surgeon.     TOTAL DISCHARGE TIME: Greater than 30 minutes  Electronically signed by:  LO Chicas CNP           DME (Durable Medical Equipment) Orders and Documentation  Orders Placed This Encounter   Procedures     Walker Order      The patient was assessed and it was determined the patient is in need of the following listed DME Supplies/Equipment. Please complete supporting documentation below to demonstrate medical necessity.      DME All Other Item(s) Documentation    List reason for need and supporting documentation for medical necessity below for each DME item.     1. Due to unsteady gait in the setting of deconditioning, chronic low back pain Kartik Swain wound benefit from lifetime use of walker to help stablize gait and reduce falls.         [facility]  [address] [place] [address]  Phone:  [phone]  Fax:  [phone]  Patient:     Kartik Swain MRN:  [id number]   [address] [place] [id number] :  1960  SSN: [id number]   Phone: [phone]  Email: No e-mail address on record Sex:  M      INS[employer]ANCE [employer] # SUBSCRIBER ID   Primary:    MEDICA [id number]      Order Date:  2021          Durable Medical Equipment Order:      Walker Order [[id number]]    Electronically signed by: Yamileth Gomez APRN CNP on 21 1618   Ordering user:  "Yamileth Gomez APRN CNP 11/29/21 1615   Diagnoses   Chronic bilateral low back pain with left-sided sciatica [M54.42, G89.29]   Physical deconditioning [R53.81]     Questionnaire    Question Answer   DME Provider: [facility]   Walker Type: Standard (2 Wheel)   Order comments: I, the undersigned, certify that the above prescribed supplies are medically necessary for this patient and is both reasonable and necessary in reference to accepted standards of medical and necessary in reference to accepted standards of medical practice in the treatment of this patient's condition and is not prescribed as a convenience.      Height: 6' 4\"  Weight: 259 lbs 0 oz     Authorizing provider infomation:  Yamileth Gomez APRN CNP   (NPI:[id number])         [facility] Locations:   Business Hours:  Monday - Friday 8am - 4:30pm  Visit [email or web address]     [facility]  [address], [address]  [place] [address]  [phone]  Monday - Friday, 8 a.m. to 4:30 p.m.     [facility]  [address], [address]  [place], [place] [address] [phone]  Monday - Friday, 8 a.m. to 4:30 p.m.        [facility]  [address], [address]  [place], [place] [address] [phone]  Monday - Friday, 8 a.m. to 4:30 p.m.     [facility]  [address], [address]  [place] [address]  [phone]  Monday - Friday, 8 a.m. to 4:30 p.m.     [facility]  [address]  [place] [address]  [phone]  Monday - Friday, 8 a.m. to 4:30 p.m.     [facility]  [address], [address]  [place] [address]  [phone]  Monday - Friday, 8 a.m. to 4:30 p.m.  *Closed daily Noon to 1 p.m. for patient deliveries     [facility]  [address], [address]     [place] [address]          "   [phone]  Monday - Friday, 8 a.m. to 5:00 p.m.                Sincerely,        LO Chicas CNP

## 2021-12-01 RX ORDER — OXYCODONE HYDROCHLORIDE 5 MG/1
5 TABLET ORAL EVERY 6 HOURS PRN
Qty: 10 TABLET | Refills: 0 | Status: SHIPPED | OUTPATIENT
Start: 2021-12-01

## 2022-06-27 RX ORDER — WARFARIN SODIUM 7.5 MG/1
7.5 TABLET ORAL DAILY
COMMUNITY

## 2022-07-01 ENCOUNTER — ANESTHESIA EVENT (OUTPATIENT)
Dept: SURGERY | Facility: AMBULATORY SURGERY CENTER | Age: 62
End: 2022-07-01
Payer: MEDICARE

## 2022-07-06 ENCOUNTER — ANESTHESIA (OUTPATIENT)
Dept: SURGERY | Facility: AMBULATORY SURGERY CENTER | Age: 62
End: 2022-07-06
Payer: MEDICARE

## 2022-07-06 ENCOUNTER — HOSPITAL ENCOUNTER (OUTPATIENT)
Facility: AMBULATORY SURGERY CENTER | Age: 62
Discharge: HOME | End: 2022-07-06
Attending: PODIATRIST
Payer: MEDICARE

## 2022-07-06 VITALS
HEIGHT: 76 IN | RESPIRATION RATE: 16 BRPM | WEIGHT: 240 LBS | SYSTOLIC BLOOD PRESSURE: 121 MMHG | DIASTOLIC BLOOD PRESSURE: 68 MMHG | BODY MASS INDEX: 29.22 KG/M2 | OXYGEN SATURATION: 93 % | TEMPERATURE: 97.5 F | HEART RATE: 75 BPM

## 2022-07-06 LAB — GLUCOSE BY METER: 166

## 2022-07-06 RX ORDER — CLOPIDOGREL BISULFATE 75 MG/1
75 TABLET ORAL
COMMUNITY
Start: 2022-01-04 | End: 2022-07-06

## 2022-07-06 RX ORDER — GLYCOPYRROLATE 0.2 MG/ML
INJECTION, SOLUTION INTRAMUSCULAR; INTRAVENOUS PRN
Status: DISCONTINUED | OUTPATIENT
Start: 2022-07-06 | End: 2022-07-06

## 2022-07-06 RX ORDER — HYDROMORPHONE HCL IN WATER/PF 6 MG/30 ML
0.2 PATIENT CONTROLLED ANALGESIA SYRINGE INTRAVENOUS EVERY 5 MIN PRN
Status: DISCONTINUED | OUTPATIENT
Start: 2022-07-06 | End: 2022-07-07 | Stop reason: HOSPADM

## 2022-07-06 RX ORDER — OXYCODONE HYDROCHLORIDE 5 MG/1
5 TABLET ORAL EVERY 4 HOURS PRN
Status: DISCONTINUED | OUTPATIENT
Start: 2022-07-06 | End: 2022-07-07 | Stop reason: HOSPADM

## 2022-07-06 RX ORDER — ONDANSETRON 2 MG/ML
4 INJECTION INTRAMUSCULAR; INTRAVENOUS EVERY 30 MIN PRN
Status: DISCONTINUED | OUTPATIENT
Start: 2022-07-06 | End: 2022-07-07 | Stop reason: HOSPADM

## 2022-07-06 RX ORDER — SODIUM CHLORIDE, SODIUM LACTATE, POTASSIUM CHLORIDE, CALCIUM CHLORIDE 600; 310; 30; 20 MG/100ML; MG/100ML; MG/100ML; MG/100ML
INJECTION, SOLUTION INTRAVENOUS CONTINUOUS
Status: DISCONTINUED | OUTPATIENT
Start: 2022-07-06 | End: 2022-07-07 | Stop reason: HOSPADM

## 2022-07-06 RX ORDER — DEXAMETHASONE SODIUM PHOSPHATE 4 MG/ML
INJECTION, SOLUTION INTRA-ARTICULAR; INTRALESIONAL; INTRAMUSCULAR; INTRAVENOUS; SOFT TISSUE PRN
Status: DISCONTINUED | OUTPATIENT
Start: 2022-07-06 | End: 2022-07-06

## 2022-07-06 RX ORDER — KETAMINE HYDROCHLORIDE 10 MG/ML
INJECTION INTRAMUSCULAR; INTRAVENOUS PRN
Status: DISCONTINUED | OUTPATIENT
Start: 2022-07-06 | End: 2022-07-06

## 2022-07-06 RX ORDER — GINSENG 100 MG
CAPSULE ORAL PRN
Status: DISCONTINUED | OUTPATIENT
Start: 2022-07-06 | End: 2022-07-06 | Stop reason: HOSPADM

## 2022-07-06 RX ORDER — ONDANSETRON 2 MG/ML
INJECTION INTRAMUSCULAR; INTRAVENOUS PRN
Status: DISCONTINUED | OUTPATIENT
Start: 2022-07-06 | End: 2022-07-06

## 2022-07-06 RX ORDER — MEPERIDINE HYDROCHLORIDE 25 MG/ML
12.5 INJECTION INTRAMUSCULAR; INTRAVENOUS; SUBCUTANEOUS
Status: DISCONTINUED | OUTPATIENT
Start: 2022-07-06 | End: 2022-07-07 | Stop reason: HOSPADM

## 2022-07-06 RX ORDER — MAGNESIUM HYDROXIDE 1200 MG/15ML
LIQUID ORAL PRN
Status: DISCONTINUED | OUTPATIENT
Start: 2022-07-06 | End: 2022-07-06 | Stop reason: HOSPADM

## 2022-07-06 RX ORDER — ACETAMINOPHEN 325 MG/1
975 TABLET ORAL ONCE
Status: COMPLETED | OUTPATIENT
Start: 2022-07-06 | End: 2022-07-06

## 2022-07-06 RX ORDER — FENTANYL CITRATE 0.05 MG/ML
25 INJECTION, SOLUTION INTRAMUSCULAR; INTRAVENOUS EVERY 5 MIN PRN
Status: DISCONTINUED | OUTPATIENT
Start: 2022-07-06 | End: 2022-07-07 | Stop reason: HOSPADM

## 2022-07-06 RX ORDER — LIDOCAINE HYDROCHLORIDE 20 MG/ML
INJECTION, SOLUTION INFILTRATION; PERINEURAL PRN
Status: DISCONTINUED | OUTPATIENT
Start: 2022-07-06 | End: 2022-07-06

## 2022-07-06 RX ORDER — PROPOFOL 10 MG/ML
INJECTION, EMULSION INTRAVENOUS CONTINUOUS PRN
Status: DISCONTINUED | OUTPATIENT
Start: 2022-07-06 | End: 2022-07-06

## 2022-07-06 RX ORDER — FENTANYL CITRATE 0.05 MG/ML
25 INJECTION, SOLUTION INTRAMUSCULAR; INTRAVENOUS
Status: DISCONTINUED | OUTPATIENT
Start: 2022-07-06 | End: 2022-07-07 | Stop reason: HOSPADM

## 2022-07-06 RX ORDER — CEFAZOLIN SODIUM 1 G/3ML
1 INJECTION, POWDER, FOR SOLUTION INTRAMUSCULAR; INTRAVENOUS EVERY 8 HOURS
Status: DISCONTINUED | OUTPATIENT
Start: 2022-07-06 | End: 2022-07-07 | Stop reason: HOSPADM

## 2022-07-06 RX ORDER — ONDANSETRON 4 MG/1
4 TABLET, ORALLY DISINTEGRATING ORAL EVERY 30 MIN PRN
Status: DISCONTINUED | OUTPATIENT
Start: 2022-07-06 | End: 2022-07-07 | Stop reason: HOSPADM

## 2022-07-06 RX ORDER — PROPOFOL 10 MG/ML
INJECTION, EMULSION INTRAVENOUS PRN
Status: DISCONTINUED | OUTPATIENT
Start: 2022-07-06 | End: 2022-07-06

## 2022-07-06 RX ORDER — LIDOCAINE 40 MG/G
CREAM TOPICAL
Status: DISCONTINUED | OUTPATIENT
Start: 2022-07-06 | End: 2022-07-07 | Stop reason: HOSPADM

## 2022-07-06 RX ADMIN — PROPOFOL 30 MG: 10 INJECTION, EMULSION INTRAVENOUS at 12:44

## 2022-07-06 RX ADMIN — GLYCOPYRROLATE 0.2 MG: 0.2 INJECTION, SOLUTION INTRAMUSCULAR; INTRAVENOUS at 12:43

## 2022-07-06 RX ADMIN — SODIUM CHLORIDE, SODIUM LACTATE, POTASSIUM CHLORIDE, CALCIUM CHLORIDE: 600; 310; 30; 20 INJECTION, SOLUTION INTRAVENOUS at 12:29

## 2022-07-06 RX ADMIN — ACETAMINOPHEN 975 MG: 325 TABLET ORAL at 12:29

## 2022-07-06 RX ADMIN — CEFAZOLIN SODIUM 1 G: 1 INJECTION, POWDER, FOR SOLUTION INTRAMUSCULAR; INTRAVENOUS at 12:39

## 2022-07-06 RX ADMIN — LIDOCAINE HYDROCHLORIDE 2 ML: 20 INJECTION, SOLUTION INFILTRATION; PERINEURAL at 12:43

## 2022-07-06 RX ADMIN — DEXAMETHASONE SODIUM PHOSPHATE 4 MG: 4 INJECTION, SOLUTION INTRA-ARTICULAR; INTRALESIONAL; INTRAMUSCULAR; INTRAVENOUS; SOFT TISSUE at 12:50

## 2022-07-06 RX ADMIN — PROPOFOL 200 MCG/KG/MIN: 10 INJECTION, EMULSION INTRAVENOUS at 12:43

## 2022-07-06 RX ADMIN — PROPOFOL 20 MG: 10 INJECTION, EMULSION INTRAVENOUS at 12:45

## 2022-07-06 RX ADMIN — ONDANSETRON 4 MG: 2 INJECTION INTRAMUSCULAR; INTRAVENOUS at 12:50

## 2022-07-06 RX ADMIN — KETAMINE HYDROCHLORIDE 20 MG: 10 INJECTION INTRAMUSCULAR; INTRAVENOUS at 12:43

## 2022-07-06 ASSESSMENT — ENCOUNTER SYMPTOMS: DYSRHYTHMIAS: 1

## 2022-07-06 NOTE — ANESTHESIA PREPROCEDURE EVALUATION
Anesthesia Pre-Procedure Evaluation    Patient: Kartik Swain   MRN: [id number] : 1960        Procedure : Procedure(s):  OSTEOTOMY RIGHT 4TH METATARSAL          Past Medical History:   Diagnosis Date     Antiplatelet or antithrombotic long-term use      Coronary artery disease      Depressive disorder, not elsewhere classified      Heart attack (H)      Hypertension      Other chronic pain      Other specified idiopathic peripheral neuropathy     Peripheral Neuropathy      Stented coronary artery      Type II or unspecified type diabetes mellitus without mention of complication, not stated as uncontrolled     DM      Past Surgical History:   Procedure Laterality Date     HC AMPUTATION TOE, MTP JT      Description: Surgery Left Foot Amputation MTP;  Recorded: 10/13/2010;  Comments:      OK ARTHRODESIS ANT INTERBODY MIN DISCECTOMY,LUMBAR      Description: Lumbar Vertebral Fusion;  Recorded: 2009;  Comments: L3-S1, initially failed but has subsequently fused     SURGICAL HISTORY OF -   ,     Eardrum Replacement      WISDOM TOOTH EXTRACTION Left      WISDOM TOOTH EXTRACTION Right       Allergies   Allergen Reactions     Hydromorphone Other (See Comments)     Lisinopril       Social History     Tobacco Use     Smoking status: Never Smoker     Smokeless tobacco: Never Used   Substance Use Topics     Alcohol use: Yes     Comment: Alcoholic Drinks/day: Very little and not very often.      Wt Readings from Last 1 Encounters:   21 117.5 kg (259 lb)        Anesthesia Evaluation   Pt has had prior anesthetic.         ROS/MED HX  ENT/Pulmonary:     (+) sleep apnea, uses CPAP,     Neurologic:  - neg neurologic ROS     Cardiovascular: Comment:  TTE  EF=40%  Mild TR  Mild MR    (+) hypertension--CAD -past MI (NSTEMI) -stent ( 2 stents to LAD)-Drug Eluting Stent. Taking blood thinners dysrhythmias, a-fib, Previous cardiac testing ()     METS/Exercise Tolerance:     Hematologic:  - neg  hematologic  ROS     Musculoskeletal:  - neg musculoskeletal ROS     GI/Hepatic:     (+) hepatitis liver disease (HUERTAS),     Renal/Genitourinary:  - neg Renal ROS     Endo:     (+) type I DM, Last HgA1c: 8,     Psychiatric/Substance Use:     (+) psychiatric history anxiety     Infectious Disease:  - neg infectious disease ROS     Malignancy:  - neg malignancy ROS     Other:  - neg other ROS    (+) , H/O Chronic Pain,        Physical Exam    Airway  airway exam normal      Mallampati: II       Respiratory Devices and Support         Dental  no notable dental history         Cardiovascular   cardiovascular exam normal       Rhythm and rate: regular and normal     Pulmonary   pulmonary exam normal        breath sounds clear to auscultation           OUTSIDE LABS:  CBC:   Lab Results   Component Value Date    WBC 9.0 11/29/2021    WBC 9.5 11/22/2021    HGB 9.7 (L) 11/29/2021    HGB 8.9 (L) 11/22/2021    HCT 30.9 (L) 11/29/2021    HCT 28.0 (L) 11/22/2021     (H) 11/29/2021     (H) 11/22/2021     BMP:   Lab Results   Component Value Date     12/15/2017     03/04/2004    POTASSIUM 4.3 12/15/2017    POTASSIUM 4.4 03/04/2004    CHLORIDE 101 12/15/2017    CHLORIDE 104 03/04/2004    CO2 22 12/15/2017    CO2 28 03/04/2004    BUN 16 12/15/2017    BUN 14 03/04/2004    CR 1.00 11/18/2021    CR 0.98 11/15/2021     (H) 12/15/2017     (H) 03/04/2004     COAGS: No results found for: PTT, INR, FIBR  POC: No results found for: BGM, HCG, HCGS  HEPATIC:   Lab Results   Component Value Date    ALBUMIN 1.7 (L) 11/29/2021    PROTTOTAL 6.6 (L) 11/29/2021    ALT 34 11/29/2021    AST 29 11/29/2021    ALKPHOS 124 11/29/2021    BILITOTAL 0.6 11/29/2021     OTHER:   Lab Results   Component Value Date    A1C 8.3 (H) 02/01/2016    JENARO 8.3 (L) 12/15/2017    CRP 94.6 11/15/2021    SED 5 12/15/2017       Anesthesia Plan    ASA Status:  3      Anesthesia Type: MAC.   Induction: Intravenous, Propofol.    Maintenance: TIVA.        Consents    Anesthesia Plan(s) and associated risks, benefits, and realistic alternatives discussed. Questions answered and patient/representative(s) expressed understanding.    - Discussed:     - Discussed with:  Patient      - Extended Intubation/Ventilatory Support Discussed: No.      - Patient is DNR/DNI Status: No    Use of blood products discussed: No .     Postoperative Care    Pain management: IV analgesics.   PONV prophylaxis: Ondansetron (or other 5HT-3), Dexamethasone or Solumedrol     Comments:    Other Comments: The patient understands and accepts the risks of MAC anesthesia including (but not limited to) nausea, vomiting, dizziness, and chipped teeth. I also discussed the possibility of conversion to GAETT/GALMA anesthesia which include hoarse voice, sore throat, and pinched lip or chipped teeth.  Versed/fent  propofol ggt  Decadron/zofran            Akshat Moore MD

## 2022-07-06 NOTE — ANESTHESIA CARE TRANSFER NOTE
Patient: Kartik Swain    Procedure: Procedure(s):  OSTEOTOMY RIGHT 4TH METATARSAL       Diagnosis: Diabetic pressure ulcer, stage 1 (H) [E11.622, L89.91]  Diagnosis Additional Information: No value filed.    Anesthesia Type:   MAC     Note:    Oropharynx: oropharynx clear of all foreign objects and spontaneously breathing  Level of Consciousness: awake  Oxygen Supplementation: room air    Independent Airway: airway patency satisfactory and stable  Dentition: dentition unchanged  Vital Signs Stable: post-procedure vital signs reviewed and stable  Report to RN Given: handoff report given  Patient transferred to: Phase II    Handoff Report: Identifed the Patient, Identified the Reponsible Provider, Reviewed the pertinent medical history, Discussed the surgical course, Reviewed Intra-OP anesthesia mangement and issues during anesthesia, Set expectations for post-procedure period and Allowed opportunity for questions and acknowledgement of understanding      Vitals:  Vitals Value Taken Time   BP 93/57 07/06/22 1319   Temp 97.5  F (36.4  C) 07/06/22 1319   Pulse 73 07/06/22 1317   Resp 16 07/06/22 1319   SpO2 94 % 07/06/22 1319   Vitals shown include unvalidated device data.    Electronically Signed By: LO Weaver CRNA  July 6, 2022  1:21 PM

## 2022-07-06 NOTE — INTERVAL H&P NOTE
"I have reviewed the surgical (or preoperative) H&P that is linked to this encounter, and examined the patient. There are no significant changes    Clinical Conditions Present on Arrival:  Clinically Significant Risk Factors Present on Admission                 # Coagulation Defect: home medication list includes an anticoagulant medication  # Platelet Defect: home medication list includes an antiplatelet medication  # Overweight: Estimated body mass index is 29.21 kg/m  as calculated from the following:    Height as of this encounter: 1.93 m (6' 4\").    Weight as of this encounter: 108.9 kg (240 lb).       "

## 2022-07-06 NOTE — ANESTHESIA POSTPROCEDURE EVALUATION
Patient: Kartik Swain    Procedure: Procedure(s):  OSTEOTOMY RIGHT 4TH METATARSAL       Anesthesia Type:  MAC    Note:  Disposition: Outpatient   Postop Pain Control: Uneventful            Sign Out: Well controlled pain   PONV: No   Neuro/Psych: Uneventful            Sign Out: Acceptable/Baseline neuro status   Airway/Respiratory: Uneventful            Sign Out: Acceptable/Baseline resp. status   CV/Hemodynamics: Uneventful            Sign Out: Acceptable CV status; No obvious hypovolemia; No obvious fluid overload   Other NRE: NONE   DID A NON-ROUTINE EVENT OCCUR? No           Last vitals:  Vitals Value Taken Time   /68 07/06/22 1345   Temp 97.5  F (36.4  C) 07/06/22 1319   Pulse 73 07/06/22 1352   Resp 16 07/06/22 1319   SpO2 93 % 07/06/22 1352   Vitals shown include unvalidated device data.    Electronically Signed By: Akshat Moore MD  July 6, 2022  4:16 PM

## 2022-07-06 NOTE — BRIEF OP NOTE
[facility]    Brief Operative Note    Pre-operative diagnosis: Diabetic pressure ulcer, stage 1 (H) [E11.622, L89.91]  Post-operative diagnosis Same as pre-operative diagnosis    Procedure: Procedure(s):  OSTEOTOMY RIGHT 4TH METATARSAL  Surgeon: Surgeon(s) and Role:     * Jonathan Mcgraw DPM - Primary  Anesthesia: MAC with Local   Estimated Blood Loss: Less than 10 ml    Drains: None  Specimens: * No specimens in log *  Findings:   None.  Complications: None.  Implants: * No implants in log *

## 2022-07-06 NOTE — OP NOTE
[facility]  July 6,2022  Pre Operative Diagnosis : Diabetic ulcer right foot   Post Operative Diagnosis: Same   Procedure: Osteotomy right fourth metatarsal     Patient was brought to the operating room and placed on the table in the supine postion. Following induction of anestesia a local anesthetic block was obtained about the right fourth metatarsal shaft utilizing ten ml of 2% xylocaine an 0.5% marcaine plain in a 50-50 mixture. The right foot was exsanguinated and a pneumatic tourniquet was placed about the right ankle and inlated to 250 mm hg. Attention was directed to the dorsal aspect of the right fourth metatarsal where an incision was made and deepened to the level of the fourth metatarsal neck. The metatarsal neck was identified and an elevating osteotomy was performed. The wound was flushed with copious amounts of sterile normal saline. The wound was closed with 4-0 nylon simple interrupted suture. The tourniquet was released and normal color returned to the foot. A sterile dressing was applied and the patient was discharged to post op recovery in stable condition.   Jonathan Mcgraw DPM

## 2022-07-06 NOTE — DISCHARGE INSTRUCTIONS
If you have any questions or concerns regarding your procedure, please contact Dr. Mcgraw, his office number is [phone].    You received 975 mg of acetaminophen (Tylenol) at 12:29 PM. Please do not take an additional dose of Tylenol until after 6:29 PM.    Do not exceed 4,000 mg of acetaminophen in a 24 hour period, keep in mind that acetaminophen can also be found in many over-the-counter cold medications as well as narcotics that may be given for pain.    Discharge Instructions for Foot Surgery    Arrange to have an adult drive you home after surgery. If you had general anesthesia, it may take a day or more to fully recover. So for at least the next 24 hours:  Do not drive or use machinery or power tools.  Do not drink alcohol.  Do not make any major decisions.     Diet    Here are some dietary suggestions following surgery:   Start with liquids and light foods (like dry toast, bananas, and applesauce). As you feel up to it, slowly return to your normal diet.  Drink at least 6 to 8 glasses of water or other nonalcoholic fluids a day.  To avoid nausea, eat before taking narcotic pain medicines.     Medicines    It is important to follow these directions:   Take all medicines as instructed.  Take pain medicines on time. Do not wait until the pain is bad before taking your medicines.  Avoid alcohol while on pain medicines.     Activity    These instructions are to help with your recovery:   Sit or lie down when possible. Put a pillow or 2 under your heel to raise your foot above the level of your heart.  Wrap an ice pack or bag of frozen peas in a thin cloth. Place it over your bandaged foot for no longer than 20 minutes. Do this 3 times a day.  You can drive again in 7 days or as instructed by your healthcare provider.  Wear your surgical shoe at all times unless told otherwise by your healthcare provider.  Use crutches or a cane as directed.  Follow your healthcare provider s instructions about putting weight  on your foot.     Bandage and cast care    Here are tips to follow:   Do not shower for 48 hours.  When you can shower again, cover the bandage, splint, or cast with a plastic bag to keep it dry.  Don t remove your bandage until your healthcare provider tells you to. If your bandage gets wet or dirty, check with your healthcare provider. You can likely replace it with a clean, dry one.     What to expect    It is normal to have the following:  Bruising and slight swelling of the foot and toes  A small amount of blood on the dressing     Call your healthcare provider     Contact your healthcare provider right away if you have any of the following:   Continuous bleeding through the bandage  Excessive swelling, increased bleeding, or redness  Fever over 101.5 F (38.6 C) or chills  Pain unrelieved by pain medicines  Foot feels cold to the touch or numb  Increased pain in your leg or foot  Chest pain or shortness of breath    Coping with pain    *Pain after surgery is normal and expected*    If you have pain after surgery, pain medicine will help you feel better. Take it as told, before pain becomes severe. Also, ask your healthcare provider or pharmacist about other ways to control pain. This might be with heat, ice, or relaxation. And follow any other instructions your surgeon or nurse gives you.    Tips for taking pain medicine    To get the best relief possible, remember these points:    Pain medicines can upset your stomach. Taking them with a little food may help.  Most pain relievers taken by mouth need at least 20 to 30 minutes to start to work.  Don't wait till your pain becomes severe before you take your medicine. Try to time your medicine so that you can take it before starting an activity. This might be before you get dressed, go for a walk, or sit down for dinner.  Constipation is a common side effect of pain medicines. You can take medicines such as laxatives (Miralax) or stool softeners to help ease  constipation. Drinking lots of fluids and eating foods such as fruits and vegetables that are high in fiber can also help.  Drinking alcohol and taking pain medicine can cause dizziness and slow your breathing. It can even be deadly. Don't drink alcohol while taking pain medicine.  Pain medicine can make you react more slowly to things. Don't drive or run machinery while taking pain medicine.  Your healthcare provider may tell you to take acetaminophen to help ease your pain. Ask him or her how much you are supposed to take each day. Acetaminophen or other pain relievers may interact with your prescription medicines or other over-the-counter (OTC) medicines. Some prescription medicines have acetaminophen and other ingredients. Using both prescription and OTC acetaminophen for pain can cause you to overdose. Read the labels on your OTC medicines with care. This will help you to clearly know the list of ingredients, how much to take, and any warnings. It may also help you not take too much acetaminophen. If you have questions or don't understand the information, ask your pharmacist or healthcare provider to explain it to you before you take the OTC medicine.    Discharge Instructions: After Your Surgery, regarding Anesthesia    You ve just had surgery. During surgery, you were given medicine called anesthesia to keep you relaxed and free of pain. After surgery, you may have some pain or nausea. This is common. Here are some tips for feeling better and getting well after surgery.    Going home    Your healthcare provider will show you how to take care of yourself when you go home. He or she will also answer your questions. Have an adult family member or friend drive you home. For the first 24 hours after your surgery:    Don't drive or use heavy equipment.  Don't make important decisions or sign legal papers.  Don't drink alcohol.  Have someone stay with you for the next 24 hours. He or she can watch for problems and  help keep you safe.  Be sure to go to all follow-up visits with your healthcare provider. And rest after your surgery for as long as your healthcare provider tells you to.    Managing nausea    Some people have an upset stomach after surgery. This is often because of anesthesia, pain, or pain medicine, or the stress of surgery. These tips will help you handle nausea and eat healthy foods as you get better. If you were on a special food plan before surgery, ask your healthcare provider if you should follow it while you get better. These tips may help:    Don't push yourself to eat. Your body will tell you when to eat and how much.  Start off with clear liquids and soup. They are easier to digest.  Next try semi-solid foods, such as mashed potatoes, applesauce, and gelatin, as you feel ready.  Slowly move to solid foods. Don t eat fatty, rich, or spicy foods at first.  Don't force yourself to have 3 large meals a day. Instead eat smaller amounts more often.  Take pain medicines with a small amount of solid food, such as crackers or toast, to prevent nausea.    If you have obstructive sleep apnea    You were given anesthesia medicine during surgery to keep you comfortable and free of pain. After surgery, you may have more apnea spells because of this medicine and other medicines you were given. The spells may last longer than usual.   At home:    Keep using the continuous positive airway pressure (CPAP) device when you sleep. Unless your healthcare provider tells you not to, use it when you sleep, day or night. CPAP is a common device used to treat obstructive sleep apnea.  Talk with your provider before taking any pain medicine, muscle relaxants, or sedatives. Your provider will tell you about the possible dangers of taking these medicines.

## 2022-10-04 PROBLEM — E11.21 TYPE 2 DIABETES MELLITUS WITH DIABETIC NEPHROPATHY (H): Status: ACTIVE | Noted: 2017-11-03

## 2023-08-28 RX ORDER — ACETAMINOPHEN 500 MG
500-1000 TABLET ORAL EVERY 6 HOURS PRN
COMMUNITY

## 2023-08-28 RX ORDER — LIRAGLUTIDE 6 MG/ML
0.6 INJECTION SUBCUTANEOUS DAILY
COMMUNITY

## 2023-08-29 ENCOUNTER — ANESTHESIA EVENT (OUTPATIENT)
Dept: SURGERY | Facility: AMBULATORY SURGERY CENTER | Age: 63
End: 2023-08-29
Payer: COMMERCIAL

## 2023-08-30 ENCOUNTER — ANESTHESIA (OUTPATIENT)
Dept: SURGERY | Facility: AMBULATORY SURGERY CENTER | Age: 63
End: 2023-08-30
Payer: COMMERCIAL

## 2023-08-30 ENCOUNTER — HOSPITAL ENCOUNTER (OUTPATIENT)
Facility: AMBULATORY SURGERY CENTER | Age: 63
Discharge: HOME | End: 2023-08-30
Attending: PODIATRIST
Payer: COMMERCIAL

## 2023-08-30 VITALS
WEIGHT: 251 LBS | BODY MASS INDEX: 30.56 KG/M2 | RESPIRATION RATE: 16 BRPM | OXYGEN SATURATION: 93 % | TEMPERATURE: 96.7 F | HEIGHT: 76 IN | SYSTOLIC BLOOD PRESSURE: 94 MMHG | DIASTOLIC BLOOD PRESSURE: 62 MMHG | HEART RATE: 66 BPM

## 2023-08-30 DIAGNOSIS — Z86.31 HISTORY OF DIABETIC ULCER OF FOOT: Primary | ICD-10-CM

## 2023-08-30 LAB
GLUCOSE POCT: 263 MG/DL (ref 70–99)
INR POINT OF CARE: 1.1 (ref 0.9–1.1)

## 2023-08-30 RX ORDER — FENTANYL CITRATE 0.05 MG/ML
25 INJECTION, SOLUTION INTRAMUSCULAR; INTRAVENOUS
Status: DISCONTINUED | OUTPATIENT
Start: 2023-08-30 | End: 2023-08-31 | Stop reason: HOSPADM

## 2023-08-30 RX ORDER — ACETAMINOPHEN 325 MG/1
975 TABLET ORAL ONCE
Status: COMPLETED | OUTPATIENT
Start: 2023-08-30 | End: 2023-08-30

## 2023-08-30 RX ORDER — SULFAMETHOXAZOLE AND TRIMETHOPRIM 400; 80 MG/1; MG/1
1 TABLET ORAL 2 TIMES DAILY
COMMUNITY

## 2023-08-30 RX ORDER — PROPOFOL 10 MG/ML
INJECTION, EMULSION INTRAVENOUS PRN
Status: DISCONTINUED | OUTPATIENT
Start: 2023-08-30 | End: 2023-08-30

## 2023-08-30 RX ORDER — OXYCODONE HYDROCHLORIDE 5 MG/1
5 TABLET ORAL
Status: DISCONTINUED | OUTPATIENT
Start: 2023-08-30 | End: 2023-08-31 | Stop reason: HOSPADM

## 2023-08-30 RX ORDER — ONDANSETRON 4 MG/1
4 TABLET, ORALLY DISINTEGRATING ORAL EVERY 30 MIN PRN
Status: DISCONTINUED | OUTPATIENT
Start: 2023-08-30 | End: 2023-08-31 | Stop reason: HOSPADM

## 2023-08-30 RX ORDER — OXYCODONE HYDROCHLORIDE 10 MG/1
10 TABLET ORAL
Status: DISCONTINUED | OUTPATIENT
Start: 2023-08-30 | End: 2023-08-31 | Stop reason: HOSPADM

## 2023-08-30 RX ORDER — ROSUVASTATIN CALCIUM 40 MG/1
40 TABLET, COATED ORAL DAILY
COMMUNITY

## 2023-08-30 RX ORDER — ONDANSETRON 2 MG/ML
4 INJECTION INTRAMUSCULAR; INTRAVENOUS EVERY 30 MIN PRN
Status: DISCONTINUED | OUTPATIENT
Start: 2023-08-30 | End: 2023-08-31 | Stop reason: HOSPADM

## 2023-08-30 RX ORDER — FENTANYL CITRATE 50 UG/ML
INJECTION, SOLUTION INTRAMUSCULAR; INTRAVENOUS PRN
Status: DISCONTINUED | OUTPATIENT
Start: 2023-08-30 | End: 2023-08-30

## 2023-08-30 RX ORDER — LIDOCAINE HYDROCHLORIDE 20 MG/ML
INJECTION, SOLUTION INFILTRATION; PERINEURAL PRN
Status: DISCONTINUED | OUTPATIENT
Start: 2023-08-30 | End: 2023-08-30

## 2023-08-30 RX ORDER — SODIUM CHLORIDE, SODIUM LACTATE, POTASSIUM CHLORIDE, CALCIUM CHLORIDE 600; 310; 30; 20 MG/100ML; MG/100ML; MG/100ML; MG/100ML
INJECTION, SOLUTION INTRAVENOUS CONTINUOUS
Status: DISCONTINUED | OUTPATIENT
Start: 2023-08-30 | End: 2023-08-31 | Stop reason: HOSPADM

## 2023-08-30 RX ORDER — PROPOFOL 10 MG/ML
INJECTION, EMULSION INTRAVENOUS CONTINUOUS PRN
Status: DISCONTINUED | OUTPATIENT
Start: 2023-08-30 | End: 2023-08-30

## 2023-08-30 RX ORDER — ONDANSETRON 2 MG/ML
INJECTION INTRAMUSCULAR; INTRAVENOUS PRN
Status: DISCONTINUED | OUTPATIENT
Start: 2023-08-30 | End: 2023-08-30

## 2023-08-30 RX ORDER — LIDOCAINE 40 MG/G
CREAM TOPICAL
Status: DISCONTINUED | OUTPATIENT
Start: 2023-08-30 | End: 2023-08-31 | Stop reason: HOSPADM

## 2023-08-30 RX ADMIN — PROPOFOL 25 MCG/KG/MIN: 10 INJECTION, EMULSION INTRAVENOUS at 11:26

## 2023-08-30 RX ADMIN — ONDANSETRON 4 MG: 2 INJECTION INTRAMUSCULAR; INTRAVENOUS at 11:35

## 2023-08-30 RX ADMIN — ACETAMINOPHEN 975 MG: 325 TABLET ORAL at 10:45

## 2023-08-30 RX ADMIN — FENTANYL CITRATE 50 MCG: 50 INJECTION, SOLUTION INTRAMUSCULAR; INTRAVENOUS at 11:26

## 2023-08-30 RX ADMIN — PROPOFOL 40 MG: 10 INJECTION, EMULSION INTRAVENOUS at 11:26

## 2023-08-30 RX ADMIN — SODIUM CHLORIDE, SODIUM LACTATE, POTASSIUM CHLORIDE, CALCIUM CHLORIDE: 600; 310; 30; 20 INJECTION, SOLUTION INTRAVENOUS at 11:13

## 2023-08-30 RX ADMIN — LIDOCAINE HYDROCHLORIDE 3 ML: 20 INJECTION, SOLUTION INFILTRATION; PERINEURAL at 11:26

## 2023-08-30 ASSESSMENT — ENCOUNTER SYMPTOMS: DYSRHYTHMIAS: 1

## 2023-08-30 NOTE — ANESTHESIA POSTPROCEDURE EVALUATION
Patient: Kartik Swain    Procedure: Procedure(s):  PARTIAL RESECTION RIGHT 5TH METATARSAL       Anesthesia Type:  MAC    Note:  Disposition: Outpatient   Postop Pain Control: Uneventful            Sign Out: Well controlled pain   PONV: No   Neuro/Psych: Uneventful            Sign Out: Acceptable/Baseline neuro status   Airway/Respiratory: Uneventful            Sign Out: Acceptable/Baseline resp. status   CV/Hemodynamics: Uneventful            Sign Out: Acceptable CV status; No obvious hypovolemia; No obvious fluid overload   Other NRE: NONE   DID A NON-ROUTINE EVENT OCCUR? No           Last vitals:  Vitals Value Taken Time   /68 08/30/23 1245   Temp 96.7  F (35.9  C) 08/30/23 1217   Pulse 64 08/30/23 1255   Resp 16 08/30/23 1217   SpO2 95 % 08/30/23 1255   Vitals shown include unvalidated device data.    Electronically Signed By: Samanta Gonzalez MD  August 30, 2023  1:03 PM

## 2023-08-30 NOTE — DISCHARGE INSTRUCTIONS
If you have any questions or concerns regarding your procedure, please contact Dr. Mcgraw, his office number is [phone].   You have received 975 mg of Acetaminophen (Tylenol) at 10:45 am. Please do not take an additional dose of Tylenol until after 4:45 pm     Do not exceed 4,000 mg of acetaminophen during a 24 hour period and keep in mind that acetaminophen can also be found in many over-the-counter cold medications as well as narcotics that may be given for pain.      Discharge Instructions for Foot Surgery    Arrange to have an adult drive you home after surgery.  If you had general anesthesia, it may take a day or more to fully recover.  For at least the next 24 hours:  Do not drive or use machinery or power tools.  Do not drink alcohol.  Do not make any major decisions.    Medicines    Take all medicines as instructed.  Take pain medicines on time.  Do not wait until the pain is bad before taking your medicines.  Avoid alcohol while on pain medicines.    Activity    Sit or lie down when possible.  Put a pillow or 2 under your heel to raise your foot above the level of your heart.  Wrap an ice pack or bag of frozen peas in a thin cloth. Place it over your bandaged foot for no longer than 20 minutes.  Do this 3 times a day.  You can drive again in 7 days or as instructed by your healthcare provider.  Wear your surgical shoe at all times unless told otherwise by your healthcare provider.  Use crutches or a cane as directed.  Follow your healthcare provider's instructions about putting weight on your foot.      Bandage and cast care    Do not shower for 48 hours.  When you can shower again, cover the bandage, splint, or cast with a plastic bag to keep it dry.  Don't remove your bandage until your healthcare provider tells you to.  If your bandage gets wet or dirty check with your healthcare provider.      It is normal to have the following:    Bruising and slight swelling of the foot and toes.  A small amount  of blood on the dressing.      Call your healthcare provider for:    Continuous bleeding through the bandage.  Excessive swelling, increased bleeding, or redness.  Fever over 101.5 F or chills.  Pain unrelieved by pain medicines.  Foot feels cold to the touch or is numb.  Increased pain in your leg or foot.  Chest pain or shortness of breath.   [facility]   Adult Discharge Orders & Instructions     For 24 hours after surgery    Get plenty of rest.  A responsible adult must stay with you for at least 24 hours after you leave the hospital.   Do not drive or use heavy equipment.  If you have weakness or tingling, don't drive or use heavy equipment until this feeling goes away.  Do not drink alcohol.  Avoid strenuous or risky activities.  Ask for help when climbing stairs.   You may feel lightheaded.  IF so, sit for a few minutes before standing.  Have someone help you get up.   If you have nausea (feel sick to your stomach): Drink only clear liquids such as apple juice, ginger ale, broth or 7-Up.  Rest may also help.  Be sure to drink enough fluids.  Move to a regular diet as you feel able.  You may have a slight fever. Call the doctor if your fever is over 100 F (37.7 C) (taken under the tongue) or lasts longer than 24 hours.  You may have a dry mouth, a sore throat, muscle aches or trouble sleeping.  These should go away after 24 hours.  Do not make important or legal decisions.   Call your doctor for any of the followin.  Signs of infection (fever, growing tenderness at the surgery site, a large amount of drainage or bleeding, severe pain, foul-smelling drainage, redness, swelling).    2. It has been over 8 to 10 hours since surgery and you are still not able to urinate (pass water).    3.  Headache for over 24 hours.    4.  Numbness, tingling or weakness the day after surgery (if you had spinal anesthesia).

## 2023-08-30 NOTE — BRIEF OP NOTE
[facility]    Brief Operative Note    Pre-operative diagnosis: Pressure ulcer, stage 2 (H) [L89.92]  Post-operative diagnosis Same as pre-operative diagnosis    Procedure: Procedure(s):  PARTIAL RESECTION RIGHT 5TH METATARSAL  Surgeon: Surgeon(s) and Role:     * Jonathan Mcgraw DPM - Primary  Anesthesia: MAC   Estimated Blood Loss: Less than 10 ml    Drains: None  Specimens: * No specimens in log *  Findings:   None.  Complications: None.  Implants: * No implants in log *

## 2023-08-30 NOTE — INTERVAL H&P NOTE
"I have reviewed the surgical (or preoperative) H&P that is linked to this encounter, and examined the patient. There are no significant changes    Clinical Conditions Present on Arrival:  Clinically Significant Risk Factors Present on Admission                # Drug Induced Coagulation Defect: home medication list includes an anticoagulant medication  # Drug Induced Platelet Defect: home medication list includes an antiplatelet medication  # Obesity: Estimated body mass index is 30.55 kg/m  as calculated from the following:    Height as of this encounter: 1.93 m (6' 4\").    Weight as of this encounter: 113.9 kg (251 lb).       "

## 2023-08-30 NOTE — ANESTHESIA CARE TRANSFER NOTE
Patient: Kartik Swain    Procedure: Procedure(s):  PARTIAL RESECTION RIGHT 5TH METATARSAL       Diagnosis: Pressure ulcer, stage 2 (H) [L89.92]  Diagnosis Additional Information: No value filed.    Anesthesia Type:   MAC     Note:    Oropharynx: oropharynx clear of all foreign objects and spontaneously breathing  Level of Consciousness: awake  Oxygen Supplementation: room air    Independent Airway: airway patency satisfactory and stable  Dentition: dentition unchanged  Vital Signs Stable: post-procedure vital signs reviewed and stable  Report to RN Given: handoff report given  Patient transferred to: Phase II    Handoff Report: Identifed the Patient, Identified the Reponsible Provider, Reviewed the pertinent medical history, Discussed the surgical course, Reviewed Intra-OP anesthesia mangement and issues during anesthesia, Set expectations for post-procedure period and Allowed opportunity for questions and acknowledgement of understanding      Vitals:  Vitals Value Taken Time   BP 83/55 08/30/23 1217   Temp 96.7  F (35.9  C) 08/30/23 1217   Pulse     Resp 16 08/30/23 1217   SpO2 94 % 08/30/23 1217       Electronically Signed By: LO Dunham CRNA  August 30, 2023  12:18 PM

## 2023-08-30 NOTE — OP NOTE
August 30 2023  [facility]    Pre Operative Diagnosis: Diabetic ulcer right fifth metatarsal  Post Operative Diagnosis: Same   Procedure: Partial resection of the right fifth metatarsal mid shaft plantarly    Patient was brought to the operating  room and placed on the table in the supine position. Following induction of anesthesia a local anesthetic block was obtained about the right fifth metatarsal utilizing ten ml of 2% xylocaine. Sterile prep and drape was performed. The foot was exsanguinated and a tourniquet was inflated about the right ankle to 250 mmhg. Attention was directed to the lateral side of the right fifth metatarsal where a llinear incision was made the incsion was deepened to the bone with care being taken to avoid any neurovasclar structures. The bone prominence was resected along with some scar tissue. The ulcer debrided amd through and  through irrigation was performed the wound was packed with nu gauze. The tourniquet was released and normal color returned to the digits and a sterile compression dressing was applied and the patient was discharged to post op recovery in stable condition   Jonathan Mcgraw DPM

## 2023-08-30 NOTE — ANESTHESIA PREPROCEDURE EVALUATION
Anesthesia Pre-Procedure Evaluation    Patient: Kartik Swain   MRN: [id number] : 1960        Procedure : Procedure(s):  PARTIAL RESECTION RIGHT 5TH METATARSAL          Past Medical History:   Diagnosis Date    Antiplatelet or antithrombotic long-term use     Coronary artery disease     Depressive disorder, not elsewhere classified     Heart attack (H)     Hypertension     Other chronic pain     Other specified idiopathic peripheral neuropathy     Peripheral Neuropathy     Stented coronary artery     Type II or unspecified type diabetes mellitus without mention of complication, not stated as uncontrolled     DM      Past Surgical History:   Procedure Laterality Date    HC AMPUTATION TOE, MTP JT      Description: Surgery Left Foot Amputation MTP;  Recorded: 10/13/2010;  Comments:     OSTEOTOMY FOOT Right 2022    Procedure: OSTEOTOMY RIGHT 4TH METATARSAL;  Surgeon: Jonathan Mcgraw DPM;  Location: [facility]    [place] ARTHRODESIS ANT INTERBODY MIN DISCECTOMY,LUMBAR      Description: Lumbar Vertebral Fusion;  Recorded: 2009;  Comments: L3-S1, initially failed but has subsequently fused    SURGICAL HISTORY OF -   ,     Eardrum Replacement     WISDOM TOOTH EXTRACTION Left     WISDOM TOOTH EXTRACTION Right       Allergies   Allergen Reactions    Hydromorphone Other (See Comments)     hallucinations    Lisinopril Cough      Social History     Tobacco Use    Smoking status: Never    Smokeless tobacco: Never   Substance Use Topics    Alcohol use: Yes     Comment: Alcoholic Drinks/day: Very little and not very often.      Wt Readings from Last 1 Encounters:   23 113.9 kg (251 lb)        Anesthesia Evaluation            ROS/MED HX  ENT/Pulmonary:       Neurologic:       Cardiovascular:     (+) Dyslipidemia hypertension- -  CAD - past MI - -   Taking blood thinners   CHF                  dysrhythmias, a-fib,             METS/Exercise Tolerance:     Hematologic:       Musculoskeletal:        GI/Hepatic:     (+)           hepatitis  liver disease,       Renal/Genitourinary:     (+) renal disease, type: CRI,            Endo:     (+)  type II DM,       Diabetic complications: nephropathy.             Psychiatric/Substance Use:     (+) psychiatric history anxiety and depression       Infectious Disease:       Malignancy:       Other:      (+)  , H/O Chronic Pain,         Physical Exam    Airway             Respiratory Devices and Support         Dental           Cardiovascular             Pulmonary               Other findings:     ECHO 1/18/22:    Summary    1. Echo  1/18/2022 12:57:00 PM.     2. Normal LV size.   LVEF 40%.  Mid to apical septal and billie-septal   walls   markedly hypokinetic.     3. Normal RV size and function.     4. Mild to moderate LA dilatation.     5. Mild MR, TR.     6. Compared to the prior study, there have been no major changes.       OUTSIDE LABS:  CBC:   Lab Results   Component Value Date    WBC 9.0 11/29/2021    WBC 9.5 11/22/2021    HGB 9.7 (L) 11/29/2021    HGB 8.9 (L) 11/22/2021    HCT 30.9 (L) 11/29/2021    HCT 28.0 (L) 11/22/2021     (H) 11/29/2021     (H) 11/22/2021     BMP:   Lab Results   Component Value Date     12/15/2017     03/04/2004    POTASSIUM 4.3 12/15/2017    POTASSIUM 4.4 03/04/2004    CHLORIDE 101 12/15/2017    CHLORIDE 104 03/04/2004    CO2 22 12/15/2017    CO2 28 03/04/2004    BUN 16 12/15/2017    BUN 14 03/04/2004    CR 1.00 11/18/2021    CR 0.98 11/15/2021     (H) 12/15/2017     (H) 03/04/2004     COAGS: No results found for: PTT, INR, FIBR  POC: No results found for: BGM, HCG, HCGS  HEPATIC:   Lab Results   Component Value Date    ALBUMIN 1.7 (L) 11/29/2021    PROTTOTAL 6.6 (L) 11/29/2021    ALT 34 11/29/2021    AST 29 11/29/2021    ALKPHOS 124 11/29/2021    BILITOTAL 0.6 11/29/2021     OTHER:   Lab Results   Component Value Date    A1C 8.3 (H) 02/01/2016    JENARO 8.3 (L) 12/15/2017    CRP 94.6 11/15/2021    SED  5 12/15/2017       Anesthesia Plan    ASA Status:  3       Anesthesia Type: MAC.              Consents            Postoperative Care            Comments:                Samanta Gonzalez MD